# Patient Record
Sex: FEMALE | Race: BLACK OR AFRICAN AMERICAN | NOT HISPANIC OR LATINO | Employment: UNEMPLOYED | RURAL
[De-identification: names, ages, dates, MRNs, and addresses within clinical notes are randomized per-mention and may not be internally consistent; named-entity substitution may affect disease eponyms.]

---

## 2022-09-27 ENCOUNTER — TELEPHONE (OUTPATIENT)
Dept: FAMILY MEDICINE CLINIC | Facility: CLINIC | Age: 22
End: 2022-09-27

## 2022-09-27 NOTE — TELEPHONE ENCOUNTER
We have gotten a refill request for a 90 day supply of her depression medication. There is a note in our old system that states she is due for a follow up regarding her anxiety medication. I have tried to call her but there is no answer and no option to leave a voice mail. TF

## 2022-09-28 ENCOUNTER — TELEPHONE (OUTPATIENT)
Dept: FAMILY MEDICINE CLINIC | Facility: CLINIC | Age: 22
End: 2022-09-28

## 2022-09-28 RX ORDER — CHLORTHALIDONE 25 MG/1
25 TABLET ORAL DAILY
Qty: 30 TABLET | Refills: 0 | Status: SHIPPED | OUTPATIENT
Start: 2022-09-28

## 2022-09-28 RX ORDER — ESCITALOPRAM OXALATE 10 MG/1
TABLET ORAL
COMMUNITY
Start: 2022-08-23 | End: 2022-09-28 | Stop reason: SDUPTHER

## 2022-09-28 RX ORDER — ESCITALOPRAM OXALATE 10 MG/1
10 TABLET ORAL DAILY
Qty: 30 TABLET | Refills: 0 | Status: SHIPPED | OUTPATIENT
Start: 2022-09-28 | End: 2022-09-29 | Stop reason: SDUPTHER

## 2022-09-28 RX ORDER — AMLODIPINE BESYLATE 5 MG/1
5 TABLET ORAL DAILY
Qty: 30 TABLET | Refills: 0 | Status: SHIPPED | OUTPATIENT
Start: 2022-09-28 | End: 2023-01-10 | Stop reason: SDUPTHER

## 2022-09-28 NOTE — TELEPHONE ENCOUNTER
Caller: Vickie Harris    Relationship: Self    Best call back number: [phone]    Requested Prescriptions:   AMLODAPINE  CHLORTHAZINE  ESCITALOPRAN    Pharmacy where request should be sent:  [id number] - [place], [place] - [address] AT NEXT TO [facility] - [phone]  - [phone]     Additional details provided by patient: HAS LESS THAN 3 DAYS    Does the patient have less than a 3 day supply:  [x] Yes  [] No    Isaac Romano Rep   09/28/22 11:49 EDT

## 2022-09-29 RX ORDER — ESCITALOPRAM OXALATE 10 MG/1
10 TABLET ORAL DAILY
Qty: 30 TABLET | Refills: 0 | Status: SHIPPED | OUTPATIENT
Start: 2022-09-29 | End: 2022-11-09

## 2022-09-29 NOTE — TELEPHONE ENCOUNTER
Caller: KimberlyVickie    Relationship: Self    Best call back number: [phone]    Requested Prescriptions:   Requested Prescriptions     Pending Prescriptions Disp Refills   • escitalopram (LEXAPRO) 10 MG tablet 30 tablet 0     Sig: Take 1 tablet by mouth Daily.        Pharmacy where request should be sent: [id number] - [place], [place] - [facility] [address] AT NEXT TO [facility] - [phone]  - [phone] FX     Additional details provided by patient: WILL BE OUT TOMORROW AND SHE HAD SOMETHING COME UP WITH GRANDMOTHER SO SHE HAD TO CANCEL HER APPOINTMENT FOR TOMORROW BUT ONLY HAS ONE TABLET LEFT OF MEDICATION SHE WILL CALL AND RESCHEDULE WHEN SHE KNOWS HER WORK SCHEDULE    Does the patient have less than a 3 day supply:  [x] Yes  [] No    Isaac Hawkins Rep   09/29/22 15:06 EDT

## 2022-11-09 ENCOUNTER — OFFICE VISIT (OUTPATIENT)
Dept: FAMILY MEDICINE CLINIC | Facility: CLINIC | Age: 22
End: 2022-11-09

## 2022-11-09 VITALS
WEIGHT: 293 LBS | DIASTOLIC BLOOD PRESSURE: 88 MMHG | TEMPERATURE: 97.2 F | SYSTOLIC BLOOD PRESSURE: 152 MMHG | HEIGHT: 67 IN | BODY MASS INDEX: 45.99 KG/M2

## 2022-11-09 DIAGNOSIS — I10 ESSENTIAL HYPERTENSION: ICD-10-CM

## 2022-11-09 DIAGNOSIS — E66.01 MORBID (SEVERE) OBESITY DUE TO EXCESS CALORIES: ICD-10-CM

## 2022-11-09 DIAGNOSIS — F41.9 ANXIETY AND DEPRESSION: ICD-10-CM

## 2022-11-09 DIAGNOSIS — B34.9 VIRAL SYNDROME: Primary | ICD-10-CM

## 2022-11-09 DIAGNOSIS — F32.A ANXIETY AND DEPRESSION: ICD-10-CM

## 2022-11-09 LAB
EXPIRATION DATE: NORMAL
FLUAV AG UPPER RESP QL IA.RAPID: NOT DETECTED
FLUBV AG UPPER RESP QL IA.RAPID: NOT DETECTED
INTERNAL CONTROL: NORMAL
Lab: NORMAL
SARS-COV-2 RNA RESP QL NAA+PROBE: NOT DETECTED

## 2022-11-09 PROCEDURE — 99214 OFFICE O/P EST MOD 30 MIN: CPT | Performed by: INTERNAL MEDICINE

## 2022-11-09 PROCEDURE — 87428 SARSCOV & INF VIR A&B AG IA: CPT | Performed by: INTERNAL MEDICINE

## 2022-11-09 RX ORDER — ESCITALOPRAM OXALATE 20 MG/1
20 TABLET ORAL DAILY
Qty: 30 TABLET | Refills: 1 | Status: SHIPPED | OUTPATIENT
Start: 2022-11-09 | End: 2022-12-02 | Stop reason: SDUPTHER

## 2022-11-09 RX ORDER — GUAIFENESIN/DEXTROMETHORPHAN 100-10MG/5
5 SYRUP ORAL 3 TIMES DAILY PRN
Qty: 120 ML | Refills: 0 | Status: SHIPPED | OUTPATIENT
Start: 2022-11-09 | End: 2022-11-29 | Stop reason: SDUPTHER

## 2022-11-09 RX ORDER — LOSARTAN POTASSIUM 50 MG/1
50 TABLET ORAL DAILY
COMMUNITY

## 2022-11-09 NOTE — ASSESSMENT & PLAN NOTE
Longstanding pattern of more anxiety and depressive symptoms for which she has been taking Lexapro 10 mg daily with benefit.  Nonetheless she feels she is had some modest breakthrough pattern for many months, with no SI/HI.  We will increase Lexapro from 10 mg up to 20 mg daily.  We could consider adding buspirone in the future.  I will let her reassess how she is doing with her regular provider, Mckenna Rea when she follows up.  Continue lifestyle modifications to benefit mood.

## 2022-11-09 NOTE — ASSESSMENT & PLAN NOTE
She has what sounds like historically good control on amlodipine 5 mg daily, chlorthalidone 25 mg daily and losartan 50 mg daily but has increased blood pressure today related to decongestant use.  Recommend avoidance.  Continue medicine and she needs to start monitoring more regularly in anticipation of her follow-up visit in 2 months for physical.  Advise continued elevations.

## 2022-11-09 NOTE — PROGRESS NOTES
Office Note     Name: Vickie Harris    : 2000     MRN: [id number]     Chief Complaint  Sore Throat (Cough, fever throat congestion diarrhea fatigue)    Subjective     History of Present Illness:  Vickie Harris is a 22 y.o. female who presents today for acute visit and dressing a couple of her medical problems.  She has had 3 to 4 days of congestion drainage and cough, some headache and achiness, mild decreased energy and appetite.  Some intermittent looser bowel movements with no vomiting or nausea.  Low-grade fever 2 nights ago, then again yesterday but starting to feel better today.  Good hydration, good urine output.  No shortness of breath.  No rash.    Unrelated to her illness, her blood pressure is higher and it sounds as though she has been taking a decongestant containing cough medicine for the last few days, I discussed recommends avoidance.  Her blood pressure otherwise is checked infrequently but when she does she believes it is in the 120s over upper 70s low 80s.  No lightheadedness, dizziness, chest pain or palpitations.  Regarding anxiety and depressive symptoms which are longstanding, she continues on Lexapro 10 mg tablet daily.  She does feel for many months has had some modest decreased energy, irritability and modest increase in her anxiety symptoms.  She would be interested in titrating up her dose of medicine.    Review of Systems    Objective     Past Medical History:   Diagnosis Date   • Acanthosis nigricans    • COVID-19    • Gastritis     secondary to NSAIDs   • Influenza due to unidentified influenza virus with other respiratory manifestations    • Major depressive disorder, single episode, unspecified    • Morbid obesity (HCC)    • Obstructive sleep apnea on CPAP    • Other conjunctivitis    • Prediabetes    • Viral infection, unspecified      Past Surgical History:   Procedure Laterality Date   • TONSILLECTOMY AND ADENOIDECTOMY       Family History   Problem Relation Age  "of Onset   • Heart failure Father    • Obesity Father    • Diabetes Other    • Hypertension Other        Vital Signs  /88   Temp 97.2 °F (36.2 °C) (Temporal)   Ht 170.2 cm (67\")   Wt (!) 200 kg (441 lb)   BMI 69.07 kg/m²   Estimated body mass index is 69.07 kg/m² as calculated from the following:    Height as of this encounter: 170.2 cm (67\").    Weight as of this encounter: 200 kg (441 lb).    Physical Exam  Constitutional:       General: She is not in acute distress.     Appearance: Normal appearance. She is obese. She is not ill-appearing, toxic-appearing or diaphoretic.   HENT:      Head: Normocephalic and atraumatic.      Right Ear: Ear canal and external ear normal.      Left Ear: Ear canal and external ear normal.      Ears:      Comments: Mild to moderate fluid behind the TMs bilaterally, otherwise clear     Nose: Rhinorrhea present.      Comments: Moderate clear rhinorrhea     Mouth/Throat:      Mouth: Mucous membranes are moist.      Pharynx: Oropharynx is clear. No oropharyngeal exudate or posterior oropharyngeal erythema.   Eyes:      Extraocular Movements: Extraocular movements intact.      Conjunctiva/sclera: Conjunctivae normal.      Pupils: Pupils are equal, round, and reactive to light.   Cardiovascular:      Rate and Rhythm: Normal rate and regular rhythm.      Pulses: Normal pulses.      Heart sounds: Normal heart sounds. No murmur heard.    No friction rub. No gallop.   Pulmonary:      Effort: Pulmonary effort is normal. No respiratory distress.      Breath sounds: Normal breath sounds. No stridor. No wheezing.   Abdominal:      General: Abdomen is flat. Bowel sounds are normal. There is no distension.      Palpations: Abdomen is soft. There is no mass.      Tenderness: There is no abdominal tenderness. There is no guarding or rebound.      Hernia: No hernia is present.   Musculoskeletal:      Right lower leg: No edema.      Left lower leg: No edema.   Skin:     General: Skin is warm " and dry.      Capillary Refill: Capillary refill takes less than 2 seconds.   Neurological:      General: No focal deficit present.      Mental Status: She is alert and oriented to person, place, and time. Mental status is at baseline.   Psychiatric:         Mood and Affect: Mood normal.         Behavior: Behavior normal.         Thought Content: Thought content normal.         Judgment: Judgment normal.                   POCT Results (if applicable):  Results for orders placed or performed in visit on 11/09/22   Covid-19 + Flu A&B AG, Veritor    Specimen: Swab   Result Value Ref Range    COVID19 Not Detected Not Detected - Ref. Range    Influenza A Antigen JUSTIN Not Detected Not Detected    Influenza B Antigen JUSTIN Not Detected Not Detected    Internal Control Passed Passed    Lot Number [id number]     Expiration Date 03/09/2023             Assessment and Plan     Diagnoses and all orders for this visit:    1. Viral syndrome (Primary)  Assessment & Plan:  Flu screen negative, COVID-19 testing negative.  Consistent with another viral illness which is common in the community right now.  Recommend symptomatic treatment saline spray, cool-mist humidifier, Tylenol/Advil as needed.  With increased blood pressure on decongestant containing cough medicine I prescribed Robitussin-DM to use as needed.  Avoid decongestants.  Advise if not improving.    Orders:  -     guaiFENesin-dextromethorphan (ROBITUSSIN DM) 100-10 MG/5ML syrup; Take 5 mL by mouth 3 (Three) Times a Day As Needed for Cough.  Dispense: 120 mL; Refill: 0  -     Covid-19 + Flu A&B AG, Veritor    2. Anxiety and depression  Assessment & Plan:  Longstanding pattern of more anxiety and depressive symptoms for which she has been taking Lexapro 10 mg daily with benefit.  Nonetheless she feels she is had some modest breakthrough pattern for many months, with no SI/HI.  We will increase Lexapro from 10 mg up to 20 mg daily.  We could consider adding buspirone in the  future.  I will let her reassess how she is doing with her regular provider, Mckenna Rea when she follows up.  Continue lifestyle modifications to benefit mood.    Orders:  -     escitalopram (Lexapro) 20 MG tablet; Take 1 tablet by mouth Daily.  Dispense: 30 tablet; Refill: 1    3. Essential hypertension  Assessment & Plan:  She has what sounds like historically good control on amlodipine 5 mg daily, chlorthalidone 25 mg daily and losartan 50 mg daily but has increased blood pressure today related to decongestant use.  Recommend avoidance.  Continue medicine and she needs to start monitoring more regularly in anticipation of her follow-up visit in 2 months for physical.  Advise continued elevations.      4. Morbid (severe) obesity due to excess calories (HCC)  Assessment & Plan:  Longstanding problem for which reinforced importance of healthy diet, exercise and benefits of weight loss.      Class 3 Severe Obesity (BMI >=40). Obesity-related health conditions include the following: hypertension. Obesity is unchanged. BMI is is above average; BMI management plan is completed. We discussed low calorie, low carb based diet program, portion control and increasing exercise.      Follow Up  Return in about 2 months (around 1/9/2023) for Annual physical.    Osbaldo Valdez MD

## 2022-11-09 NOTE — ASSESSMENT & PLAN NOTE
Longstanding problem for which reinforced importance of healthy diet, exercise and benefits of weight loss.

## 2022-11-09 NOTE — ASSESSMENT & PLAN NOTE
Flu screen negative, COVID-19 testing negative.  Consistent with another viral illness which is common in the community right now.  Recommend symptomatic treatment saline spray, cool-mist humidifier, Tylenol/Advil as needed.  With increased blood pressure on decongestant containing cough medicine I prescribed Robitussin-DM to use as needed.  Avoid decongestants.  Advise if not improving.

## 2022-11-29 ENCOUNTER — OFFICE VISIT (OUTPATIENT)
Dept: FAMILY MEDICINE CLINIC | Facility: CLINIC | Age: 22
End: 2022-11-29

## 2022-11-29 VITALS
HEART RATE: 126 BPM | TEMPERATURE: 97.1 F | SYSTOLIC BLOOD PRESSURE: 128 MMHG | RESPIRATION RATE: 18 BRPM | BODY MASS INDEX: 45.99 KG/M2 | HEIGHT: 67 IN | WEIGHT: 293 LBS | OXYGEN SATURATION: 97 % | DIASTOLIC BLOOD PRESSURE: 76 MMHG

## 2022-11-29 DIAGNOSIS — R05.9 COUGH, UNSPECIFIED TYPE: Primary | ICD-10-CM

## 2022-11-29 DIAGNOSIS — J40 BRONCHITIS: ICD-10-CM

## 2022-11-29 DIAGNOSIS — B34.9 VIRAL SYNDROME: ICD-10-CM

## 2022-11-29 PROCEDURE — 87428 SARSCOV & INF VIR A&B AG IA: CPT | Performed by: NURSE PRACTITIONER

## 2022-11-29 PROCEDURE — 99213 OFFICE O/P EST LOW 20 MIN: CPT | Performed by: NURSE PRACTITIONER

## 2022-11-29 RX ORDER — GUAIFENESIN/DEXTROMETHORPHAN 100-10MG/5
5 SYRUP ORAL 3 TIMES DAILY PRN
Qty: 120 ML | Refills: 0 | Status: SHIPPED | OUTPATIENT
Start: 2022-11-29 | End: 2023-03-29

## 2022-11-29 RX ORDER — AZITHROMYCIN 250 MG/1
TABLET, FILM COATED ORAL
Qty: 6 TABLET | Refills: 0 | Status: SHIPPED | OUTPATIENT
Start: 2022-11-29 | End: 2023-03-29

## 2022-11-29 NOTE — PROGRESS NOTES
Office Note     Name: Vickie Harris    : 2000     MRN: [id number]     Chief Complaint  Cough, mucus, Fatigue, and Generalized Body Aches    Subjective     History of Present Illness:  Vickie Harris is a 22 y.o. female who presents today for acute complaints of worsening cough, non-productive in nature. She was initially seen by Dr. Osbaldo Valdez  with complaints of congestion, drainage and cough  X 3-4 days.   Flu screen and Flu negative. She was given Robitussin DM for viral syndrome at that time. She reports she initially showed improvement in symptoms, however about three days ago her cough worsened and became productive of yellow to green sputum. She also notes some accompanying feelings of chest tightness since her cough worsened. No fever, sore throat, ear pain, rash or GI symptoms. No further complaints or concerns today.   Review of Systems   Constitutional: Positive for fatigue. Negative for chills and fever.   HENT: Positive for congestion. Negative for ear pain, rhinorrhea and sore throat.    Eyes: Negative for discharge and redness.   Respiratory: Positive for cough and chest tightness. Negative for shortness of breath and wheezing.    Cardiovascular: Negative for chest pain, palpitations and leg swelling.   Gastrointestinal: Negative for abdominal pain, constipation, diarrhea, nausea and vomiting.   Endocrine: Negative for polydipsia and polyuria.   Musculoskeletal: Negative for arthralgias, back pain and myalgias.   Skin: Negative for rash.   Neurological: Negative for dizziness, weakness and headache.   Psychiatric/Behavioral: Negative for sleep disturbance and depressed mood. The patient is not nervous/anxious.        Objective     Past Medical History:   Diagnosis Date   • Acanthosis nigricans    • COVID-19    • Gastritis     secondary to NSAIDs   • Influenza due to unidentified influenza virus with other respiratory manifestations    • Major depressive disorder, single episode,  "unspecified    • Morbid obesity (HCC)    • Obstructive sleep apnea on CPAP    • Other conjunctivitis    • Prediabetes    • Viral infection, unspecified      Past Surgical History:   Procedure Laterality Date   • TONSILLECTOMY AND ADENOIDECTOMY       Family History   Problem Relation Age of Onset   • Heart failure Father    • Obesity Father    • Diabetes Other    • Hypertension Other        Vital Signs  /76 (BP Location: Left arm, Patient Position: Sitting, Cuff Size: Adult)   Pulse (!) 126   Temp 97.1 °F (36.2 °C) (Temporal)   Resp 18   Ht 170.2 cm (67\")   Wt (!) 207 kg (456 lb)   SpO2 97%   BMI 71.42 kg/m²   Estimated body mass index is 71.42 kg/m² as calculated from the following:    Height as of this encounter: 170.2 cm (67\").    Weight as of this encounter: 207 kg (456 lb).    Physical Exam         POCT Results (if applicable):  Results for orders placed or performed in visit on 11/29/22   Covid-19 + Flu A&B AG, Veritor    Specimen: Swab   Result Value Ref Range    COVID19 Not Detected Not Detected - Ref. Range    Influenza A Antigen JUSTIN Not Detected Not Detected    Influenza B Antigen JUSTIN Not Detected Not Detected    Internal Control Passed Passed    Lot Number [id number]     Expiration Date 3,092,023             Assessment and Plan     Diagnoses and all orders for this visit:    1. Cough, unspecified type (Primary)  -     Covid-19 + Flu A&B AG, Veritor    2. Viral syndrome    3. Bronchitis  Assessment & Plan:  Initially evaluated earlier this month on 11/09 with complaints of cough, congestion and low grade fever. Flu and COVID negative. She was treated for other viral syndrome with robitussin DM. She reports her symptoms initially improved, however 3 or 4 days ago her cough worsened, became productive of yellow to green sputum and she began to feel a sense of tightness in her chest. Cough noticeably worse at night.     -Azithromycin zpack as written for bronchitis secondary to viral syndrome not " COVID or Flu  -Robitussin DM as written for cough  -cool mist humidifier    Orders:  -     azithromycin (Zithromax Z-Jeffery) 250 MG tablet; Take 2 tablets by mouth on day 1, then 1 tablet daily on days 2-5  Dispense: 6 tablet; Refill: 0  -     guaiFENesin-dextromethorphan (ROBITUSSIN DM) 100-10 MG/5ML syrup; Take 5 mL by mouth 3 (Three) Times a Day As Needed for Cough.  Dispense: 120 mL; Refill: 0      Follow Up  No follow-ups on file.    Mckenna Rea, APRN

## 2022-11-29 NOTE — ASSESSMENT & PLAN NOTE
Initially evaluated earlier this month on 11/09 with complaints of cough, congestion and low grade fever. Flu and COVID negative. She was treated for other viral syndrome with robitussin DM. She reports her symptoms initially improved, however 3 or 4 days ago her cough worsened, became productive of yellow to green sputum and she began to feel a sense of tightness in her chest. Cough noticeably worse at night.     -Azithromycin zpack as written for bronchitis secondary to viral syndrome not COVID or Flu  -Robitussin DM as written for cough  -cool mist humidifier

## 2022-12-02 DIAGNOSIS — F32.A ANXIETY AND DEPRESSION: ICD-10-CM

## 2022-12-02 DIAGNOSIS — F41.9 ANXIETY AND DEPRESSION: ICD-10-CM

## 2022-12-02 RX ORDER — ESCITALOPRAM OXALATE 20 MG/1
20 TABLET ORAL DAILY
Qty: 30 TABLET | Refills: 1 | Status: SHIPPED | OUTPATIENT
Start: 2022-12-02 | End: 2023-03-08

## 2023-01-10 RX ORDER — AMLODIPINE BESYLATE 5 MG/1
5 TABLET ORAL DAILY
Qty: 30 TABLET | Refills: 0 | Status: SHIPPED | OUTPATIENT
Start: 2023-01-10

## 2023-01-10 NOTE — TELEPHONE ENCOUNTER
Caller: Vickie Harris    Relationship: Self    Best call back number: [phone]    Requested Prescriptions:   Requested Prescriptions     Pending Prescriptions Disp Refills   • amLODIPine (NORVASC) 5 MG tablet 30 tablet 0     Sig: Take 1 tablet by mouth Daily.      Pharmacy where request should be sent: [id number] - [place], [place] - [facility] [address]EXT TO [facility] - [phone]  - [phone] FX     Additional details provided by patient:     PATIENT ADVISED SHE IS COMPLETELY OUT OF THE MEDICATION.    PATIENT ADVISED SHE RECENTLY MOVED AND HAS MISPLACED HER MEDICATION ACCIDENTALLY.     Does the patient have less than a 3 day supply:  [x] Yes  [] No    Would you like a call back once the refill request has been completed: [x] Yes [] No    If the office needs to give you a call back, can they leave a voicemail: [x] Yes [] No    Isaac Crocker Rep   01/10/23 11:57 EST         DELETE AFTER READING TO PATIENT: “Thank you for sharing this information with me. I will send a message to the clinical team. Please allow 48 hours for the clinical staff to follow up on this request.”

## 2023-03-08 DIAGNOSIS — F32.A ANXIETY AND DEPRESSION: ICD-10-CM

## 2023-03-08 DIAGNOSIS — F41.9 ANXIETY AND DEPRESSION: ICD-10-CM

## 2023-03-08 RX ORDER — ESCITALOPRAM OXALATE 20 MG/1
TABLET ORAL
Qty: 30 TABLET | Refills: 1 | Status: SHIPPED | OUTPATIENT
Start: 2023-03-08

## 2023-03-29 ENCOUNTER — OFFICE VISIT (OUTPATIENT)
Dept: FAMILY MEDICINE CLINIC | Facility: CLINIC | Age: 23
End: 2023-03-29
Payer: COMMERCIAL

## 2023-03-29 VITALS — HEIGHT: 67 IN | BODY MASS INDEX: 45.99 KG/M2 | WEIGHT: 293 LBS | TEMPERATURE: 98.1 F

## 2023-03-29 DIAGNOSIS — B34.9 VIRAL SYNDROME: Primary | ICD-10-CM

## 2023-03-29 PROCEDURE — 99213 OFFICE O/P EST LOW 20 MIN: CPT | Performed by: NURSE PRACTITIONER

## 2023-03-29 RX ORDER — BROMPHENIRAMINE MALEATE, PSEUDOEPHEDRINE HYDROCHLORIDE, AND DEXTROMETHORPHAN HYDROBROMIDE 2; 30; 10 MG/5ML; MG/5ML; MG/5ML
10 SYRUP ORAL 4 TIMES DAILY PRN
Qty: 200 ML | Refills: 0 | Status: SHIPPED | OUTPATIENT
Start: 2023-03-29

## 2023-03-29 NOTE — PROGRESS NOTES
Office Note     Name: Vickie Harris    : 2000     MRN: [id number]     Chief Complaint  Nasal Congestion, Shortness of Breath, Cough (Started monday), and Fever (Highest 102.3)    Subjective     History of Present Illness:  Vickie Harris is a 22 y.o. female who presents today for complaints of nasal congestion, shortness of breath, cough and fever.  She reports 2 days ago awakening with a scratchy throat and some chest heaviness.  Over the course of that day she developed harsh, nonproductive cough, nasal congestion and spiked fever of 102.3.  She denies any sore throat, ear pain or GI issues.  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  She is taking home COVID test which was negative.  She has utilized over-the-counter cough syrup with no benefit. She has also utilized Vicks VapoRub on her chest with some benefit.  She notes decreased appetite but has continued to hydrate adequately. she makes note of significant headache with coughing.  No further complaints or concerns  Review of Systems   Constitutional: Positive for appetite change, fatigue and fever.   HENT: Positive for congestion and sinus pressure. Negative for ear pain and sore throat.    Respiratory: Positive for cough and chest tightness. Negative for choking, shortness of breath and wheezing.    Cardiovascular: Negative for chest pain, palpitations and leg swelling.   Gastrointestinal: Negative for abdominal pain, constipation, diarrhea, nausea and vomiting.   Endocrine: Negative for polydipsia and polyuria.   Neurological: Positive for headache. Negative for dizziness, weakness, light-headedness and numbness.       Objective     Past Medical History:   Diagnosis Date   • Acanthosis nigricans    • COVID-19    • Gastritis     secondary to NSAIDs   • Influenza due to unidentified influenza virus with other respiratory manifestations    • Major depressive disorder, single episode,  "unspecified    • Morbid obesity (HCC)    • Obstructive sleep apnea on CPAP    • Other conjunctivitis    • Prediabetes    • Viral infection, unspecified      Past Surgical History:   Procedure Laterality Date   • TONSILLECTOMY AND ADENOIDECTOMY       Family History   Problem Relation Age of Onset   • Heart failure Father    • Obesity Father    • Diabetes Other    • Hypertension Other        Vital Signs  Temp 98.1 °F (36.7 °C) (Temporal)   Ht 170.2 cm (67\")   Wt (!) 204 kg (450 lb)   BMI 70.48 kg/m²   Estimated body mass index is 70.48 kg/m² as calculated from the following:    Height as of this encounter: 170.2 cm (67\").    Weight as of this encounter: 204 kg (450 lb).    Physical Exam  Vitals reviewed.   Constitutional:       Appearance: Normal appearance.   HENT:      Head: Normocephalic and atraumatic.      Right Ear: Tympanic membrane, ear canal and external ear normal.      Left Ear: Tympanic membrane, ear canal and external ear normal.      Nose: Nose normal.      Mouth/Throat:      Pharynx: Oropharynx is clear. Posterior oropharyngeal erythema present.   Eyes:      Conjunctiva/sclera: Conjunctivae normal.   Cardiovascular:      Rate and Rhythm: Normal rate and regular rhythm.      Pulses: Normal pulses.      Heart sounds: Normal heart sounds.   Pulmonary:      Effort: Pulmonary effort is normal. No respiratory distress.      Breath sounds: Normal breath sounds. No wheezing or rhonchi.   Musculoskeletal:      Cervical back: Normal range of motion and neck supple.   Skin:     General: Skin is warm and dry.   Neurological:      Mental Status: She is alert and oriented to person, place, and time.            POCT Results (if applicable):  Results for orders placed or performed in visit on 11/29/22   Covid-19 + Flu A&B AG, Veritor    Specimen: Swab   Result Value Ref Range    COVID19 Not Detected Not Detected - Ref. Range    Influenza A Antigen JUSTIN Not Detected Not Detected    Influenza B Antigen JUSTIN Not Detected " Not Detected    Internal Control Passed Passed    Lot Number [id number]     Expiration Date 3,092,023             Assessment and Plan     Diagnoses and all orders for this visit:    1. Viral syndrome (Primary)  Assessment & Plan:  Flu and COVID screen negative Sister with another viral illness which is common in the right now.  May continue symptomatic treatment.  Advised use of cool-mist humidifier, Tylenol/Advil as needed.  We will send a prescription for Bromfed cough syrup as her current Robitussin has been ineffective thus far.  Discussed signs of secondary bacterial infection including improvement followed by significant worsening of symptoms.  Discussed if she develops difficulty breathing, productive cough or notable wheezing to call office for further instruction.      Other orders  -     brompheniramine-pseudoephedrine-DM 30-2-10 MG/5ML syrup; Take 10 mL by mouth 4 (Four) Times a Day As Needed for Allergies.  Dispense: 200 mL; Refill: 0        Follow Up  No follow-ups on file.    Mckenna Rea, APRN

## 2023-03-29 NOTE — ASSESSMENT & PLAN NOTE
Flu and COVID screen negative Sister with another viral illness which is common in the right now.  May continue symptomatic treatment.  Advised use of cool-mist humidifier, Tylenol/Advil as needed.  We will send a prescription for Bromfed cough syrup as her current Robitussin has been ineffective thus far.  Discussed signs of secondary bacterial infection including improvement followed by significant worsening of symptoms.  Discussed if she develops difficulty breathing, productive cough or notable wheezing to call office for further instruction.

## 2023-04-07 ENCOUNTER — TELEPHONE (OUTPATIENT)
Dept: FAMILY MEDICINE CLINIC | Facility: CLINIC | Age: 23
End: 2023-04-07
Payer: COMMERCIAL

## 2023-04-07 RX ORDER — AZITHROMYCIN 250 MG/1
TABLET, FILM COATED ORAL
Qty: 6 TABLET | Refills: 0 | Status: SHIPPED | OUTPATIENT
Start: 2023-04-07

## 2023-04-07 NOTE — TELEPHONE ENCOUNTER
PHONE CALL FROM PATIENT.  SYMPTOMS ARE WORSE.  HEAVY COUGHING, CONGESTED CHEST, MUCUS, SORE THROAT, EAR PAIN.  WHAT TO DO?    PLEASE CALL [phone]

## 2023-05-26 RX ORDER — AMLODIPINE BESYLATE 5 MG/1
TABLET ORAL
Qty: 30 TABLET | Refills: 0 | Status: SHIPPED | OUTPATIENT
Start: 2023-05-26

## 2023-06-15 RX ORDER — CHLORTHALIDONE 25 MG/1
TABLET ORAL
Qty: 30 TABLET | Refills: 0 | Status: SHIPPED | OUTPATIENT
Start: 2023-06-15

## 2023-06-15 RX ORDER — LOSARTAN POTASSIUM 50 MG/1
50 TABLET ORAL DAILY
Qty: 90 TABLET | Refills: 0 | Status: SHIPPED | OUTPATIENT
Start: 2023-06-15 | End: 2023-06-19

## 2023-06-15 NOTE — TELEPHONE ENCOUNTER
Caller: KimberlyVickie thomas    Relationship: Self    Best call back number: [phone]     Requested Prescriptions:   Requested Prescriptions     Pending Prescriptions Disp Refills    losartan (COZAAR) 50 MG tablet       Sig: Take 1 tablet by mouth Daily.        Pharmacy where request should be sent: [id number] - [place], [place] - [address]EXT TO [facility] - [phone]  - [phone]      Last office visit with prescribing clinician: 3/29/2023   Last telemedicine visit with prescribing clinician: Visit date not found   Next office visit with prescribing clinician: Visit date not found     Additional details provided by patient: PATIENT IS OUT OF REFILLS AND MEDICATION    Does the patient have less than a 3 day supply:  [x] Yes  [] No    Would you like a call back once the refill request has been completed: [x] Yes [] No    If the office needs to give you a call back, can they leave a voicemail: [x] Yes [] No    Isaac Ospina Rep   06/15/23 10:22 EDT

## 2023-06-19 ENCOUNTER — OFFICE VISIT (OUTPATIENT)
Dept: FAMILY MEDICINE CLINIC | Facility: CLINIC | Age: 23
End: 2023-06-19
Payer: COMMERCIAL

## 2023-06-19 VITALS
TEMPERATURE: 98 F | WEIGHT: 293 LBS | OXYGEN SATURATION: 99 % | SYSTOLIC BLOOD PRESSURE: 146 MMHG | HEART RATE: 98 BPM | HEIGHT: 67 IN | BODY MASS INDEX: 45.99 KG/M2 | DIASTOLIC BLOOD PRESSURE: 82 MMHG

## 2023-06-19 DIAGNOSIS — J30.1 SEASONAL ALLERGIC RHINITIS DUE TO POLLEN: ICD-10-CM

## 2023-06-19 DIAGNOSIS — I10 ESSENTIAL HYPERTENSION: ICD-10-CM

## 2023-06-19 DIAGNOSIS — N30.01 ACUTE CYSTITIS WITH HEMATURIA: Primary | ICD-10-CM

## 2023-06-19 PROBLEM — R30.0 DYSURIA: Status: ACTIVE | Noted: 2023-06-19

## 2023-06-19 LAB
BILIRUB BLD-MCNC: NEGATIVE MG/DL
CLARITY, POC: ABNORMAL
COLOR UR: YELLOW
GLUCOSE UR STRIP-MCNC: NEGATIVE MG/DL
KETONES UR QL: NEGATIVE
LEUKOCYTE EST, POC: ABNORMAL
NITRITE UR-MCNC: NEGATIVE MG/ML
PH UR: 6.5 [PH] (ref 5–8)
PROT UR STRIP-MCNC: ABNORMAL MG/DL
RBC # UR STRIP: ABNORMAL /UL
SP GR UR: 1.01 (ref 1–1.03)
UROBILINOGEN UR QL: ABNORMAL

## 2023-06-19 PROCEDURE — 99214 OFFICE O/P EST MOD 30 MIN: CPT | Performed by: INTERNAL MEDICINE

## 2023-06-19 PROCEDURE — 3077F SYST BP >= 140 MM HG: CPT | Performed by: INTERNAL MEDICINE

## 2023-06-19 PROCEDURE — 1159F MED LIST DOCD IN RCRD: CPT | Performed by: INTERNAL MEDICINE

## 2023-06-19 PROCEDURE — 3079F DIAST BP 80-89 MM HG: CPT | Performed by: INTERNAL MEDICINE

## 2023-06-19 PROCEDURE — 1160F RVW MEDS BY RX/DR IN RCRD: CPT | Performed by: INTERNAL MEDICINE

## 2023-06-19 RX ORDER — CETIRIZINE HYDROCHLORIDE 10 MG/1
10 TABLET ORAL DAILY
Qty: 30 TABLET | Refills: 3 | Status: SHIPPED | OUTPATIENT
Start: 2023-06-19

## 2023-06-19 RX ORDER — LOSARTAN POTASSIUM 100 MG/1
100 TABLET ORAL DAILY
Qty: 30 TABLET | Refills: 1 | Status: SHIPPED | OUTPATIENT
Start: 2023-06-19

## 2023-06-19 RX ORDER — FLUTICASONE PROPIONATE 50 MCG
2 SPRAY, SUSPENSION (ML) NASAL DAILY
Qty: 15.8 ML | Refills: 3 | Status: SHIPPED | OUTPATIENT
Start: 2023-06-19

## 2023-06-19 RX ORDER — NITROFURANTOIN 25; 75 MG/1; MG/1
100 CAPSULE ORAL 2 TIMES DAILY
Qty: 14 CAPSULE | Refills: 0 | Status: SHIPPED | OUTPATIENT
Start: 2023-06-19

## 2023-06-19 NOTE — ASSESSMENT & PLAN NOTE
Ongoing pattern congestion and drainage, breakthrough of the last month or so.  Prescription provided for Zyrtec and Flonase to use together for the next couple weeks, then as needed seasonally.  Singulair could be added in future with breakthrough symptoms.  Additional benefit of saline spray, nasal flushing.  Advise concerns.

## 2023-06-19 NOTE — ASSESSMENT & PLAN NOTE
Blood pressure elevated in clinic, and with patient report running typically in the 130s to 140s systolic over mid upper 80s diastolic.  Increase losartan 50 mg up to 100 mg daily, continue Norvasc 5 mg daily and chlorthalidone 25 mg daily.  Continue benefits of healthy diet, exercise, weight loss, salt decrease.  On her blood pressure regularly.  Reassess at follow-up visit with Mckenna Rea in 1 month for physical.

## 2023-06-19 NOTE — ASSESSMENT & PLAN NOTE
Typical UTI type symptoms, with diagnosis consistent with acute cystitis with modest hematuria.  Onset over the last couple days with no constitutional Davis.  Initiate nitrofurantoin 100 mg twice daily x7 days.  Push fluids, she can continue using the Azo.  Tylenol/Advil as needed.  Advised if not improving.

## 2023-06-19 NOTE — PROGRESS NOTES
"    Office Note     Name: Vickie Harris    : 2000     MRN: [id number]     Chief Complaint  Urinary Tract Infection (Patient states she is having a burning sensation along with pain, smell to urine, been going on for 3 days. )    Subjective     History of Present Illness:  Vickie Harris is a 22 y.o. female who presents today for acute visit.  Regular patient of Mckenna Rea.  Main concern of urinary symptoms with onset the last few days with urinary frequency, urgency, sense of incomplete voiding.  Also strong smell of the urine.  No flank pain.  Mild suprapubic discomfort which waxes and wanes related urination.  No fevers or chills.  Otherwise she feels at baseline.  Regarding blood pressure, modestly elevated in clinic today and she does not check it very often but when she does she believes it is in the 130s to 140s systolic, unsure exact diastolic measurement.  She also keeps some component of congestion drainage worse the last few weeks, with sneezing, but no shortness of breath or chest tightness.  Sometimes a popping sensation in the ears.    Review of Systems    Objective     Past Medical History:   Diagnosis Date    Acanthosis nigricans     COVID-19     Gastritis     secondary to NSAIDs    Influenza due to unidentified influenza virus with other respiratory manifestations     Major depressive disorder, single episode, unspecified     Morbid obesity     Obstructive sleep apnea on CPAP     Other conjunctivitis     Prediabetes     Viral infection, unspecified      Past Surgical History:   Procedure Laterality Date    TONSILLECTOMY AND ADENOIDECTOMY       Family History   Problem Relation Age of Onset    Heart failure Father     Obesity Father     Diabetes Other     Hypertension Other        Vital Signs  /82 (BP Location: Left arm, Patient Position: Sitting, Cuff Size: Large Adult)   Pulse 98   Temp 98 °F (36.7 °C) (Temporal)   Ht 170.2 cm (67\")   Wt (!) 254 kg (560 lb)   SpO2 99%   " "BMI 87.71 kg/m²   Estimated body mass index is 87.71 kg/m² as calculated from the following:    Height as of this encounter: 170.2 cm (67\").    Weight as of this encounter: 254 kg (560 lb).    Physical Exam  Constitutional:       General: She is not in acute distress.     Appearance: Normal appearance. She is not ill-appearing, toxic-appearing or diaphoretic.   HENT:      Head: Normocephalic and atraumatic.      Right Ear: Ear canal and external ear normal.      Left Ear: Ear canal and external ear normal.      Ears:      Comments: Mild to moderate fluid behind the TMs bilaterally, otherwise clear     Nose: Nose normal. Rhinorrhea present.      Comments: Moderate clear rhinorrhea, pale mucosa     Mouth/Throat:      Mouth: Mucous membranes are moist.      Pharynx: Oropharynx is clear. No oropharyngeal exudate or posterior oropharyngeal erythema.   Neck:      Vascular: No carotid bruit.      Comments: No thyroid enlargement  Cardiovascular:      Rate and Rhythm: Normal rate and regular rhythm.      Pulses: Normal pulses.      Heart sounds: Normal heart sounds. No murmur heard.    No friction rub. No gallop.   Pulmonary:      Effort: Pulmonary effort is normal. No respiratory distress.      Breath sounds: Normal breath sounds. No stridor. No wheezing.   Abdominal:      General: Abdomen is flat. Bowel sounds are normal. There is no distension.      Palpations: Abdomen is soft.      Tenderness: There is abdominal tenderness. There is no guarding or rebound.      Comments: Very mild tenderness in suprapubic region, negative rebound and guarding.  Negative CVA tenderness bilaterally.  Abdominal exam limited by body habitus.   Musculoskeletal:      Cervical back: Neck supple. No tenderness.      Right lower leg: No edema.      Left lower leg: No edema.   Lymphadenopathy:      Cervical: No cervical adenopathy.   Skin:     General: Skin is warm and dry.      Capillary Refill: Capillary refill takes less than 2 seconds. "   Neurological:      General: No focal deficit present.      Mental Status: She is alert and oriented to person, place, and time. Mental status is at baseline.   Psychiatric:         Mood and Affect: Mood normal.         Behavior: Behavior normal.         Thought Content: Thought content normal.                 POCT Results (if applicable):  Results for orders placed or performed in visit on 06/19/23   POC Urinalysis Dipstick    Specimen: Urine   Result Value Ref Range    Color Yellow Yellow, Straw, Dark Yellow, Kerry    Clarity, UA Cloudy (A) Clear    Glucose, UA Negative (A) Negative mg/dL    Bilirubin Negative Negative    Ketones, UA Negative Negative    Specific Gravity  1.015 1.005 - 1.030    Blood, UA 1+ (A) Negative    pH, Urine 6.5 5.0 - 8.0    Protein, POC Trace (A) Negative mg/dL    Urobilinogen, UA 0.2 E.U./dL Normal, 0.2 E.U./dL    Leukocytes Trace (A) Negative    Nitrite, UA Negative Negative            Assessment and Plan     Diagnoses and all orders for this visit:    1. Acute cystitis with hematuria (Primary)  Assessment & Plan:  Typical UTI type symptoms, with diagnosis consistent with acute cystitis with modest hematuria.  Onset over the last couple days with no constitutional Davis.  Initiate nitrofurantoin 100 mg twice daily x7 days.  Push fluids, she can continue using the Azo.  Tylenol/Advil as needed.  Advised if not improving.    Orders:  -     POC Urinalysis Dipstick  -     nitrofurantoin, macrocrystal-monohydrate, (Macrobid) 100 MG capsule; Take 1 capsule by mouth 2 (Two) Times a Day.  Dispense: 14 capsule; Refill: 0    2. Essential hypertension  Assessment & Plan:  Blood pressure elevated in clinic, and with patient report running typically in the 130s to 140s systolic over mid upper 80s diastolic.  Increase losartan 50 mg up to 100 mg daily, continue Norvasc 5 mg daily and chlorthalidone 25 mg daily.  Continue benefits of healthy diet, exercise, weight loss, salt decrease.  On her blood  pressure regularly.  Reassess at follow-up visit with Mckenna Rea in 1 month for physical.      3. Seasonal allergic rhinitis due to pollen  Assessment & Plan:  Ongoing pattern congestion and drainage, breakthrough of the last month or so.  Prescription provided for Zyrtec and Flonase to use together for the next couple weeks, then as needed seasonally.  Singulair could be added in future with breakthrough symptoms.  Additional benefit of saline spray, nasal flushing.  Advise concerns.    Orders:  -     cetirizine (zyrTEC) 10 MG tablet; Take 1 tablet by mouth Daily.  Dispense: 30 tablet; Refill: 3  -     fluticasone (FLONASE) 50 MCG/ACT nasal spray; 2 sprays into the nostril(s) as directed by provider Daily.  Dispense: 15.8 mL; Refill: 3    Other orders  -     losartan (Cozaar) 100 MG tablet; Take 1 tablet by mouth Daily.  Dispense: 30 tablet; Refill: 1          Follow Up  Return in about 1 month (around 7/19/2023) for Annual physical.    Osbaldo Valdez MD

## 2023-08-03 RX ORDER — CHLORTHALIDONE 25 MG/1
TABLET ORAL
Qty: 30 TABLET | Refills: 0 | Status: SHIPPED | OUTPATIENT
Start: 2023-08-03

## 2023-08-03 RX ORDER — AMLODIPINE BESYLATE 5 MG/1
TABLET ORAL
Qty: 30 TABLET | Refills: 0 | Status: SHIPPED | OUTPATIENT
Start: 2023-08-03

## 2023-08-29 RX ORDER — AMLODIPINE BESYLATE 5 MG/1
TABLET ORAL
Qty: 30 TABLET | Refills: 0 | Status: SHIPPED | OUTPATIENT
Start: 2023-08-29

## 2023-10-06 RX ORDER — AMLODIPINE BESYLATE 5 MG/1
TABLET ORAL
Qty: 30 TABLET | Refills: 0 | Status: SHIPPED | OUTPATIENT
Start: 2023-10-06

## 2023-10-13 RX ORDER — CHLORTHALIDONE 25 MG/1
25 TABLET ORAL DAILY
Qty: 30 TABLET | Refills: 0 | OUTPATIENT
Start: 2023-10-13

## 2023-10-18 DIAGNOSIS — F32.A ANXIETY AND DEPRESSION: ICD-10-CM

## 2023-10-18 DIAGNOSIS — F41.9 ANXIETY AND DEPRESSION: ICD-10-CM

## 2023-10-18 RX ORDER — ESCITALOPRAM OXALATE 20 MG/1
20 TABLET ORAL DAILY
Qty: 30 TABLET | Refills: 1 | Status: SHIPPED | OUTPATIENT
Start: 2023-10-18

## 2023-10-18 RX ORDER — CHLORTHALIDONE 25 MG/1
25 TABLET ORAL DAILY
Qty: 30 TABLET | Refills: 0 | OUTPATIENT
Start: 2023-10-18

## 2023-10-18 NOTE — TELEPHONE ENCOUNTER
Caller: Vickie Harris    Relationship: Self    Best call back number:  [phone]     Requested Prescriptions:   Requested Prescriptions     Pending Prescriptions Disp Refills    escitalopram (LEXAPRO) 20 MG tablet 30 tablet 1     Sig: Take 1 tablet by mouth Daily.        Pharmacy where request should be sent: [facility]ACY #[id number] - [place] [address] [address]EXT TO [facility] - [phone]  - [phone]      Last office visit with prescribing clinician: 3/29/2023   Last telemedicine visit with prescribing clinician: Visit date not found   Next office visit with prescribing clinician: 11/2/2023     Additional details provided by patient: HAS BEEN OUT A FEW DAYS.    Does the patient have less than a 3 day supply:  [x] Yes  [] No    Would you like a call back once the refill request has been completed: [] Yes [x] No    If the office needs to give you a call back, can they leave a voicemail: [] Yes [x] No    Isaac Wang Rep   10/18/23 16:24 EDT

## 2023-11-21 RX ORDER — AMLODIPINE BESYLATE 5 MG/1
5 TABLET ORAL DAILY
Qty: 90 TABLET | Refills: 0 | Status: SHIPPED | OUTPATIENT
Start: 2023-11-21

## 2023-11-21 NOTE — TELEPHONE ENCOUNTER
Caller: Vickie Harris    Relationship: Self    Best call back number: [phone]    Requested Prescriptions:   Requested Prescriptions     Pending Prescriptions Disp Refills    amLODIPine (NORVASC) 5 MG tablet 30 tablet 0     Sig: Take 1 tablet by mouth Daily.        Pharmacy where request should be sent: [facility]/PHARMACY #[id number] - [place] [address] [address]EXT TO [facility] - [phone]  - [phone]      Last office visit with prescribing clinician: 3/29/2023   Last telemedicine visit with prescribing clinician: Visit date not found   Next office visit with prescribing clinician: 12/20/2023     Additional details provided by patient:     Does the patient have less than a 3 day supply:  [] Yes  [x] No    Isaac Downing Rep   11/21/23 13:43 EST

## 2023-11-29 DIAGNOSIS — F32.A ANXIETY AND DEPRESSION: ICD-10-CM

## 2023-11-29 DIAGNOSIS — F41.9 ANXIETY AND DEPRESSION: ICD-10-CM

## 2023-11-29 DIAGNOSIS — J30.1 SEASONAL ALLERGIC RHINITIS DUE TO POLLEN: ICD-10-CM

## 2023-11-29 RX ORDER — ESCITALOPRAM OXALATE 20 MG/1
20 TABLET ORAL DAILY
Qty: 30 TABLET | Refills: 0 | Status: SHIPPED | OUTPATIENT
Start: 2023-11-29

## 2023-11-29 RX ORDER — CETIRIZINE HYDROCHLORIDE 10 MG/1
10 TABLET ORAL DAILY
Qty: 30 TABLET | Refills: 3 | Status: SHIPPED | OUTPATIENT
Start: 2023-11-29

## 2023-11-29 NOTE — TELEPHONE ENCOUNTER
Has cancelled the last 4 visit I will send in a refill if she does not keep the appointment no more refills will be given

## 2023-11-30 RX ORDER — CHLORTHALIDONE 25 MG/1
TABLET ORAL
Qty: 90 TABLET | Refills: 0 | Status: SHIPPED | OUTPATIENT
Start: 2023-11-30

## 2023-12-22 ENCOUNTER — OFFICE VISIT (OUTPATIENT)
Dept: FAMILY MEDICINE CLINIC | Facility: CLINIC | Age: 23
End: 2023-12-22
Payer: COMMERCIAL

## 2023-12-22 VITALS
SYSTOLIC BLOOD PRESSURE: 124 MMHG | BODY MASS INDEX: 45.99 KG/M2 | DIASTOLIC BLOOD PRESSURE: 82 MMHG | TEMPERATURE: 97.9 F | WEIGHT: 293 LBS | HEART RATE: 86 BPM | OXYGEN SATURATION: 99 % | HEIGHT: 67 IN

## 2023-12-22 DIAGNOSIS — I10 ESSENTIAL HYPERTENSION: ICD-10-CM

## 2023-12-22 DIAGNOSIS — F32.A ANXIETY AND DEPRESSION: Primary | ICD-10-CM

## 2023-12-22 DIAGNOSIS — E66.01 MORBID (SEVERE) OBESITY DUE TO EXCESS CALORIES: ICD-10-CM

## 2023-12-22 DIAGNOSIS — H66.001 NON-RECURRENT ACUTE SUPPURATIVE OTITIS MEDIA OF RIGHT EAR WITHOUT SPONTANEOUS RUPTURE OF TYMPANIC MEMBRANE: ICD-10-CM

## 2023-12-22 DIAGNOSIS — F41.9 ANXIETY AND DEPRESSION: Primary | ICD-10-CM

## 2023-12-22 RX ORDER — AZITHROMYCIN 250 MG/1
TABLET, FILM COATED ORAL
Qty: 6 TABLET | Refills: 0 | Status: SHIPPED | OUTPATIENT
Start: 2023-12-22

## 2023-12-22 NOTE — ASSESSMENT & PLAN NOTE
Complaints of bilateral ear discomfort that has been present for several weeks, right greater than left.  She denies any issues with otic discharge or draining, however on physical exam bilateral bulging tympanic membranes with diffuse erythema noted.  -Patient with well-documented penicillin allergy, will treat empirically with azithromycin.  Also advised benefits of continuance of daily Zyrtec and Flonase

## 2023-12-22 NOTE — ASSESSMENT & PLAN NOTE
Blood pressure remains well-controlled on losartan 100 mg daily.  Blood pressure in office today 124/82.

## 2023-12-22 NOTE — PROGRESS NOTES
Office Note     Name: Vickie Harris    : 2000     MRN: [id number]     Chief Complaint  rx follow up    Subjective     History of Present Illness:  Vickie Harris is a 23 y.o. female who presents today for follow-up on chronic conditions including hypertension, morbid obesity and anxiety/depression.  Patient has struggled with her weight her whole life.  She is currently working towards dietary modification.  Stating that she realizes she is emotionally driven eater and is trying to minimally work on correcting that behavior.  She is also now working on the campus of [facility] which is requiring her to walk more.  Patient reports ongoing mood stability on current dosing of Lexapro.  She does state that December and January are generally hard months for her as it is the anniversary of the death of her father.  Her blood pressure is well-controlled in office today, 124/82.  She notes well-controlled blood pressure when taken at home as well.  She is planning on scheduling her annual physical exam before leaving today.  She does have some complaints of bilateral ear discomfort that has been present for several weeks.  No issues with otic discharge or draining.  She has no further complaints or concerns today    Review of Systems   Constitutional:  Negative for activity change and fatigue.   HENT:  Positive for ear pain, postnasal drip and sinus pressure. Negative for ear discharge.    Respiratory:  Negative for cough, chest tightness, shortness of breath and wheezing.    Cardiovascular:  Negative for chest pain, palpitations and leg swelling.   Gastrointestinal:  Negative for abdominal pain, constipation, diarrhea, nausea and vomiting.   Endocrine: Negative for polydipsia and polyuria.   Genitourinary:  Negative for difficulty urinating and hematuria.   Musculoskeletal:  Negative for arthralgias and myalgias.   Neurological:  Negative for dizziness, weakness, light-headedness and  "headache.       Objective     Past Medical History:   Diagnosis Date    Acanthosis nigricans     COVID-19     Gastritis     secondary to NSAIDs    Influenza due to unidentified influenza virus with other respiratory manifestations     Major depressive disorder, single episode, unspecified     Morbid obesity     Obstructive sleep apnea on CPAP     Other conjunctivitis     Prediabetes     Viral infection, unspecified      Past Surgical History:   Procedure Laterality Date    TONSILLECTOMY AND ADENOIDECTOMY       Family History   Problem Relation Age of Onset    Heart failure Father     Obesity Father     Diabetes Other     Hypertension Other        Vital Signs  /82 (BP Location: Left arm, Patient Position: Sitting, Cuff Size: Large Adult)   Pulse 86   Temp 97.9 °F (36.6 °C) (Temporal)   Ht 170.2 cm (67\")   Wt (!) 254 kg (560 lb)   SpO2 99%   BMI 87.71 kg/m²   Estimated body mass index is 87.71 kg/m² as calculated from the following:    Height as of this encounter: 170.2 cm (67\").    Weight as of this encounter: 254 kg (560 lb).    Physical Exam  Vitals reviewed.   Constitutional:       Appearance: Normal appearance. She is obese.   HENT:      Head: Normocephalic and atraumatic.      Right Ear: Ear canal and external ear normal. A middle ear effusion is present. Tympanic membrane is erythematous and bulging.      Left Ear: Ear canal and external ear normal. A middle ear effusion is present.      Nose: Nose normal.      Mouth/Throat:      Pharynx: Oropharynx is clear.   Eyes:      Conjunctiva/sclera: Conjunctivae normal.   Neck:      Comments: + acanthosis nigricans  Cardiovascular:      Rate and Rhythm: Normal rate and regular rhythm.      Pulses: Normal pulses.      Heart sounds: Normal heart sounds.   Pulmonary:      Effort: Pulmonary effort is normal.      Breath sounds: Normal breath sounds.   Abdominal:      General: Bowel sounds are normal.      Palpations: Abdomen is soft.   Musculoskeletal:      " Cervical back: Neck supple.   Skin:     General: Skin is warm and dry.   Neurological:      Mental Status: She is alert and oriented to person, place, and time.               POCT Results (if applicable):  Results for orders placed or performed in visit on 06/19/23   POC Urinalysis Dipstick    Specimen: Urine   Result Value Ref Range    Color Yellow Yellow, Straw, Dark Yellow, Kerry    Clarity, UA Cloudy (A) Clear    Glucose, UA Negative (A) Negative mg/dL    Bilirubin Negative Negative    Ketones, UA Negative Negative    Specific Gravity  1.015 1.005 - 1.030    Blood, UA 1+ (A) Negative    pH, Urine 6.5 5.0 - 8.0    Protein, POC Trace (A) Negative mg/dL    Urobilinogen, UA 0.2 E.U./dL Normal, 0.2 E.U./dL    Leukocytes Trace (A) Negative    Nitrite, UA Negative Negative            Assessment and Plan     Diagnoses and all orders for this visit:    1. Anxiety and depression (Primary)  Assessment & Plan:  Patient reports ongoing mood stability on current dosing of Lexapro.  She does state that December and January are generally hard months for her as it is the anniversary of the death of her father.   -Continue lexapro 20mg daily      2. Essential hypertension  Assessment & Plan:   Blood pressure remains well-controlled on losartan 100 mg daily.  Blood pressure in office today 124/82.      3. Morbid (severe) obesity due to excess calories  Assessment & Plan:  Patient has struggled with her weight her whole life.  She is currently working towards dietary modification.  Stating that she realizes she is emotionally driven eater and is trying to minimally work on correcting that behavior.  She is also now working on the campus of [employer] which is requiring her to walk more.  Discussed multiple other options to assist with weight loss including pharmacologic management with turns appetite as well as different types of bariatric intervention.  Patient states that her current weight she is not comfortable  with the idea of undergoing anesthesia for bariatric intervention.  She is going to make appointment for annual physical at which time we will further investigate pharmacologic assistance.      4. Non-recurrent acute suppurative otitis media of right ear without spontaneous rupture of tympanic membrane  Assessment & Plan:  Complaints of bilateral ear discomfort that has been present for several weeks, right greater than left.  She denies any issues with otic discharge or draining, however on physical exam bilateral bulging tympanic membranes with diffuse erythema noted.  -Patient with well-documented penicillin allergy, will treat empirically with azithromycin.  Also advised benefits of continuance of daily Zyrtec and Flonase      Other orders  -     azithromycin (Zithromax Z-Jeffery) 250 MG tablet; Take 2 tablets by mouth on day 1, then 1 tablet daily on days 2-5  Dispense: 6 tablet; Refill: 0      Class 3 Severe Obesity (BMI >=40). Obesity-related health conditions include the following: hypertension. Obesity is worsening. BMI is is above average; BMI management plan is completed. We discussed portion control, increasing exercise, and pharmacologic options including tirzepatide and bariatric intervention .        Vaccine Counseling:  “Discussed risks/benefits to vaccination, reviewed components of the vaccine, discussed VIS, discussed informed consent, informed consent obtained. Patient/Parent was allowed to accept or refuse vaccine. Questions answered to satisfactory state of patient/Parent. We reviewed typical age appropriate and seasonally appropriate vaccinations. Reviewed immunization history and updated state vaccination form as needed. Patient was counseled on HPV    Advanced Care Planning:   Patient does not have an advance directive, declines further assistance.      Follow Up  No follow-ups on file.    GABRIELA Livingston

## 2023-12-22 NOTE — ASSESSMENT & PLAN NOTE
Patient reports ongoing mood stability on current dosing of Lexapro.  She does state that December and January are generally hard months for her as it is the anniversary of the death of her father.   -Continue lexapro 20mg daily

## 2023-12-22 NOTE — ASSESSMENT & PLAN NOTE
Patient has struggled with her weight her whole life.  She is currently working towards dietary modification.  Stating that she realizes she is emotionally driven eater and is trying to minimally work on correcting that behavior.  She is also now working on the campus of [facility] which is requiring her to walk more.  Discussed multiple other options to assist with weight loss including pharmacologic management with turns appetite as well as different types of bariatric intervention.  Patient states that her current weight she is not comfortable with the idea of undergoing anesthesia for bariatric intervention.  She is going to make appointment for annual physical at which time we will further investigate pharmacologic assistance.

## 2023-12-28 ENCOUNTER — PATIENT ROUNDING (BHMG ONLY) (OUTPATIENT)
Dept: FAMILY MEDICINE CLINIC | Facility: CLINIC | Age: 23
End: 2023-12-28
Payer: COMMERCIAL

## 2023-12-28 NOTE — PROGRESS NOTES
.A [employer] message has been sent to the patient for patient rounding with [facility].  
49

## 2024-02-01 DIAGNOSIS — F41.9 ANXIETY AND DEPRESSION: ICD-10-CM

## 2024-02-01 DIAGNOSIS — F32.A ANXIETY AND DEPRESSION: ICD-10-CM

## 2024-02-01 RX ORDER — ESCITALOPRAM OXALATE 20 MG/1
20 TABLET ORAL DAILY
Qty: 30 TABLET | Refills: 0 | Status: SHIPPED | OUTPATIENT
Start: 2024-02-01

## 2024-02-01 NOTE — TELEPHONE ENCOUNTER
Caller: Vickie Harris    Relationship: Self    Best call back number: [phone]     Requested Prescriptions:   Requested Prescriptions     Pending Prescriptions Disp Refills    escitalopram (LEXAPRO) 20 MG tablet 30 tablet 0     Sig: Take 1 tablet by mouth Daily.        Pharmacy where request should be sent: [facility]/PHARMACY #[id number] - [place] [address] [address] [facility]EXT TO [facility] - [phone]  - [phone]     Last office visit with prescribing clinician: 12/22/2023   Last telemedicine visit with prescribing clinician: Visit date not found   Next office visit with prescribing clinician: Visit date not found     Additional details provided by patient: PT IS OUT OF RX. SHE ACCIDENTALLY MAY HAVE THROWN IT OUT.    Does the patient have less than a 3 day supply:  [x] Yes  [] No    Would you like a call back once the refill request has been completed: [] Yes [x] No    If the office needs to give you a call back, can they leave a voicemail: [] Yes [x] No    Isaac George Rep   02/01/24 15:32 EST

## 2024-02-19 RX ORDER — AMLODIPINE BESYLATE 5 MG/1
5 TABLET ORAL DAILY
Qty: 90 TABLET | Refills: 0 | Status: SHIPPED | OUTPATIENT
Start: 2024-02-19

## 2024-02-26 RX ORDER — CHLORTHALIDONE 25 MG/1
TABLET ORAL
Qty: 90 TABLET | Refills: 0 | Status: SHIPPED | OUTPATIENT
Start: 2024-02-26

## 2024-02-27 DIAGNOSIS — F41.9 ANXIETY AND DEPRESSION: ICD-10-CM

## 2024-02-27 DIAGNOSIS — F32.A ANXIETY AND DEPRESSION: ICD-10-CM

## 2024-02-27 DIAGNOSIS — J30.1 SEASONAL ALLERGIC RHINITIS DUE TO POLLEN: ICD-10-CM

## 2024-02-27 RX ORDER — ESCITALOPRAM OXALATE 20 MG/1
20 TABLET ORAL DAILY
Qty: 30 TABLET | Refills: 0 | Status: SHIPPED | OUTPATIENT
Start: 2024-02-27

## 2024-02-27 RX ORDER — FLUTICASONE PROPIONATE 50 MCG
SPRAY, SUSPENSION (ML) NASAL
Qty: 16 ML | Refills: 3 | Status: SHIPPED | OUTPATIENT
Start: 2024-02-27

## 2024-03-05 ENCOUNTER — OFFICE VISIT (OUTPATIENT)
Dept: FAMILY MEDICINE CLINIC | Facility: CLINIC | Age: 24
End: 2024-03-05
Payer: COMMERCIAL

## 2024-03-05 VITALS — TEMPERATURE: 100.9 F | WEIGHT: 293 LBS | BODY MASS INDEX: 45.99 KG/M2 | HEIGHT: 67 IN

## 2024-03-05 DIAGNOSIS — U07.1 COVID-19: ICD-10-CM

## 2024-03-05 DIAGNOSIS — J02.0 STREP PHARYNGITIS: ICD-10-CM

## 2024-03-05 DIAGNOSIS — J02.9 SORE THROAT: Primary | ICD-10-CM

## 2024-03-05 LAB
EXPIRATION DATE: ABNORMAL
EXPIRATION DATE: ABNORMAL
FLUAV AG UPPER RESP QL IA.RAPID: NOT DETECTED
FLUBV AG UPPER RESP QL IA.RAPID: NOT DETECTED
INTERNAL CONTROL: ABNORMAL
INTERNAL CONTROL: ABNORMAL
Lab: 7933
Lab: ABNORMAL
S PYO AG THROAT QL: POSITIVE
SARS-COV-2 AG UPPER RESP QL IA.RAPID: DETECTED

## 2024-03-05 PROCEDURE — 99213 OFFICE O/P EST LOW 20 MIN: CPT | Performed by: NURSE PRACTITIONER

## 2024-03-05 PROCEDURE — 87880 STREP A ASSAY W/OPTIC: CPT | Performed by: NURSE PRACTITIONER

## 2024-03-05 RX ORDER — BROMPHENIRAMINE MALEATE, PSEUDOEPHEDRINE HYDROCHLORIDE, AND DEXTROMETHORPHAN HYDROBROMIDE 2; 30; 10 MG/5ML; MG/5ML; MG/5ML
10 SYRUP ORAL 4 TIMES DAILY PRN
Qty: 200 ML | Refills: 0 | Status: SHIPPED | OUTPATIENT
Start: 2024-03-05

## 2024-03-05 RX ORDER — ONDANSETRON 8 MG/1
8 TABLET, ORALLY DISINTEGRATING ORAL EVERY 8 HOURS PRN
Qty: 15 TABLET | Refills: 0 | Status: SHIPPED | OUTPATIENT
Start: 2024-03-05

## 2024-03-05 RX ORDER — PREDNISONE 20 MG/1
40 TABLET ORAL DAILY
Qty: 14 TABLET | Refills: 0 | Status: SHIPPED | OUTPATIENT
Start: 2024-03-05 | End: 2024-03-12

## 2024-03-05 RX ORDER — AZITHROMYCIN 500 MG/1
500 TABLET, FILM COATED ORAL DAILY
Status: CANCELLED | OUTPATIENT
Start: 2024-03-05

## 2024-03-05 RX ORDER — AZITHROMYCIN 250 MG/1
TABLET, FILM COATED ORAL
Qty: 6 TABLET | Refills: 0 | Status: SHIPPED | OUTPATIENT
Start: 2024-03-05

## 2024-03-05 NOTE — PROGRESS NOTES
"    Office Note     Name: Vickie Harris    : 2000     MRN: [id number]     Chief Complaint  Diarrhea, Headache, Fever, Shortness of Breath, Nasal Congestion, and Cough    Subjective     History of Present Illness:  Vickie Harris is a 23 y.o. female who presents today for symptoms that started      Review of Systems    Objective     Past Medical History:   Diagnosis Date    Acanthosis nigricans     COVID-19     Gastritis     secondary to NSAIDs    Influenza due to unidentified influenza virus with other respiratory manifestations     Major depressive disorder, single episode, unspecified     Morbid obesity     Obstructive sleep apnea on CPAP     Other conjunctivitis     Prediabetes     Viral infection, unspecified      Past Surgical History:   Procedure Laterality Date    TONSILLECTOMY AND ADENOIDECTOMY       Family History   Problem Relation Age of Onset    Heart failure Father     Obesity Father     Diabetes Other     Hypertension Other        Vital Signs  Temp (!) 100.9 °F (38.3 °C) (Temporal)   Ht 170.2 cm (67\")   Wt (!) 254 kg (560 lb)   BMI 87.71 kg/m²   Estimated body mass index is 87.71 kg/m² as calculated from the following:    Height as of this encounter: 170.2 cm (67\").    Weight as of this encounter: 254 kg (560 lb).  Facility age limit for growth %holly is 20 years.    Physical Exam     {The following data was reviewed by (Optional):75697}  {Ambulatory Labs (Optional):94565}  {Data reviewed (Optional):83592:::1}     POCT Results (if applicable):  Results for orders placed or performed in visit on 24   Covid-19 + Flu A&B AG, Veritor    Specimen: Swab   Result Value Ref Range    SARS Antigen Detected (A) Not Detected, Presumptive Negative    Influenza A Antigen JUSTIN Not Detected Not Detected    Influenza B Antigen JUSTIN Not Detected Not Detected    Internal Control Passed Passed    Lot Number [id number]     Expiration Date     POC Rapid Strep A    Specimen: Swab   Result " Value Ref Range    Rapid Strep A Screen Positive (A) Negative, VALID, INVALID, Not Performed    Internal Control Passed Passed    Lot Number [id number]     Expiration Date 7,142,025             Assessment and Plan     Diagnoses and all orders for this visit:    1. Sore throat (Primary)  -     Covid-19 + Flu A&B AG, Veritor  -     POC Rapid Strep A    2. COVID-19    3. Strep pharyngitis           {Time Spent (Optional):49683}    Vaccine Counseling:  {RBWIMMCOUNSEL (Optional):78462}    Advanced Care Planning:   {Advance Directive Status:96754}    Smoking Cessation:   {time:64845}    Follow Up  No follow-ups on file.    Mckenna Rea, APRN

## 2024-03-05 NOTE — ASSESSMENT & PLAN NOTE
Patient testing positive for COVID-19 in office today, negative for influenza.  Patient is candidate for antiviral Paxlovid therapy due to severe morbid obesity. Discussed possible side effects including nausea.  Patient is not on any contraindicated medications that we will need holding while on Paxlovid.  Otherwise symptom management for cough and congestion with prescribed Bromfed cough syrup times daily as needed.  Patient also advised to continue use of daily Flonase.  Patient is being given prednisone for chest tightness and bronchial inflammation.  Patient advised on current CDC guidelines stating 5 days of strict quarantine from onset of symptoms.  After initial 5-day quarantine as long as symptoms are improving and patient feels up to it may return to normal activity with continued mask wearing for 5 more days.  Despite decreased appetite encouraged to push fluids and get plenty of rest.

## 2024-03-05 NOTE — PROGRESS NOTES
Office Note     Name: Vickie Harris    : 2000     MRN: [id number]     Chief Complaint  Diarrhea, Headache, Fever, Shortness of Breath, Nasal Congestion, and Cough    Subjective     History of Present Illness:  Vickie Harris is a 23 y.o. female who presents today for acute complaints of diarrhea, headache, fever, shortness of breath, nasal congestion, sore throat and cough.  Patient states her symptoms developed approximately 48 hours ago.  She initially thought she was suffering from common allergy symptoms with a mild scratchy throat.  Her symptoms have continued to progressively worsened since onset.  She has taken over-the-counter cold and cough medications without any benefit.  She has been febrile with max temp noted to be 101.5 earlier this morning.  Her fever initially responded well to ibuprofen, however on arrival to office temperature back up to 100.9.  Patient has diffuse myalgias and extreme fatigue.  She states her cough is dry in nature but sounds wet.  Denies any respiratory distress.  She feels as though at times she will have wheezing and chest tightness.  She has no further complaints or concerns    Review of Systems   Constitutional:  Positive for chills, fatigue and fever.   HENT:  Positive for ear pain, sinus pressure and sore throat. Negative for ear discharge.    Eyes:  Negative for visual disturbance.   Respiratory:  Positive for cough, chest tightness and shortness of breath. Negative for wheezing.    Cardiovascular:  Negative for chest pain, palpitations and leg swelling.   Gastrointestinal:  Positive for diarrhea and nausea. Negative for abdominal pain and vomiting.   Endocrine: Negative for polydipsia and polyuria.   Musculoskeletal:  Positive for myalgias.   Skin:  Negative for rash.   Neurological:  Positive for headache. Negative for dizziness and weakness.       Objective     Past Medical History:   Diagnosis Date    Acanthosis nigricans     COVID-19     Gastritis      "secondary to NSAIDs    Influenza due to unidentified influenza virus with other respiratory manifestations     Major depressive disorder, single episode, unspecified     Morbid obesity     Obstructive sleep apnea on CPAP     Other conjunctivitis     Prediabetes     Viral infection, unspecified      Past Surgical History:   Procedure Laterality Date    TONSILLECTOMY AND ADENOIDECTOMY       Family History   Problem Relation Age of Onset    Heart failure Father     Obesity Father     Diabetes Other     Hypertension Other        Vital Signs  Temp (!) 100.9 °F (38.3 °C) (Temporal)   Ht 170.2 cm (67\")   Wt (!) 254 kg (560 lb)   BMI 87.71 kg/m²   Estimated body mass index is 87.71 kg/m² as calculated from the following:    Height as of this encounter: 170.2 cm (67\").    Weight as of this encounter: 254 kg (560 lb).  Facility age limit for growth %holly is 20 years.    Physical Exam  Vitals reviewed.   Constitutional:       Appearance: She is obese.   HENT:      Head: Normocephalic and atraumatic.      Right Ear: Ear canal and external ear normal. A middle ear effusion is present. Tympanic membrane is erythematous. Tympanic membrane is not perforated, retracted or bulging.      Left Ear: Ear canal and external ear normal. A middle ear effusion is present. Tympanic membrane is erythematous. Tympanic membrane is not perforated, retracted or bulging.      Ears:      Comments: Bilateral serous effusions noted     Nose: Congestion present.      Mouth/Throat:      Pharynx: Oropharynx is clear. Posterior oropharyngeal erythema present.   Eyes:      Conjunctiva/sclera: Conjunctivae normal.      Pupils: Pupils are equal, round, and reactive to light.   Cardiovascular:      Rate and Rhythm: Normal rate and regular rhythm.      Pulses: Normal pulses.      Heart sounds: Normal heart sounds.   Pulmonary:      Effort: Pulmonary effort is normal. No respiratory distress.      Breath sounds: Rhonchi present. No wheezing.   Abdominal:     "  General: Bowel sounds are normal. There is no distension.      Palpations: Abdomen is soft.      Tenderness: There is no abdominal tenderness.   Musculoskeletal:      Cervical back: Neck supple.   Skin:     General: Skin is warm and dry.   Neurological:      Mental Status: She is alert and oriented to person, place, and time.             POCT Results (if applicable):  Results for orders placed or performed in visit on 03/05/24   Covid-19 + Flu A&B AG, Veritor    Specimen: Swab   Result Value Ref Range    SARS Antigen Detected (A) Not Detected, Presumptive Negative    Influenza A Antigen JUSTIN Not Detected Not Detected    Influenza B Antigen JUSTIN Not Detected Not Detected    Internal Control Passed Passed    Lot Number [id number]     Expiration Date 1,172,025    POC Rapid Strep A    Specimen: Swab   Result Value Ref Range    Rapid Strep A Screen Positive (A) Negative, VALID, INVALID, Not Performed    Internal Control Passed Passed    Lot Number [id number]     Expiration Date 7,142,025             Assessment and Plan     Diagnoses and all orders for this visit:    1. Sore throat (Primary)  -     Covid-19 + Flu A&B AG, Veritor  -     POC Rapid Strep A    2. COVID-19  Assessment & Plan:  Patient testing positive for COVID-19 in office today, negative for influenza.  Patient is candidate for antiviral Paxlovid therapy due to severe morbid obesity. Discussed possible side effects including nausea.  Patient is not on any contraindicated medications that we will need holding while on Paxlovid.  Otherwise symptom management for cough and congestion with prescribed Bromfed cough syrup times daily as needed.  Patient also advised to continue use of daily Flonase.  Patient is being given prednisone for chest tightness and bronchial inflammation.  Patient advised on current CDC guidelines stating 5 days of strict quarantine from onset of symptoms.  After initial 5-day quarantine as long as symptoms are improving and patient feels up to  it may return to normal activity with continued mask wearing for 5 more days.  Despite decreased appetite encouraged to push fluids and get plenty of rest.    Orders:  -     Nirmatrelvir & Ritonavir, 300mg/100mg, (PAXLOVID) 20 x 150 MG & 10 x 100MG tablet therapy pack tablet; Take 3 tablets by mouth 2 (Two) Times a Day for 5 days.  Dispense: 30 tablet; Refill: 0  -     brompheniramine-pseudoephedrine-DM 30-2-10 MG/5ML syrup; Take 10 mL by mouth 4 (Four) Times a Day As Needed for Congestion or Cough.  Dispense: 200 mL; Refill: 0  -     predniSONE (DELTASONE) 20 MG tablet; Take 2 tablets by mouth Daily for 7 days.  Dispense: 14 tablet; Refill: 0    3. Strep pharyngitis  Assessment & Plan:  In addition to COVID-19 patient also testing positive for strep pharyngitis.  She has documented penicillin allergy, will treat empirically with azithromycin Z-Jeffery as directed.  Patient will need new toothbrush in 3 to 4 days.  Given work excuse for both COVID and strep pharyngitis.      Other orders  -     ondansetron ODT (ZOFRAN-ODT) 8 MG disintegrating tablet; Place 1 tablet on the tongue Every 8 (Eight) Hours As Needed for Nausea or Vomiting.  Dispense: 15 tablet; Refill: 0  -     azithromycin (Zithromax Z-Jeffery) 250 MG tablet; Take 2 tablets by mouth on day 1, then 1 tablet daily on days 2-5  Dispense: 6 tablet; Refill: 0             Follow Up  No follow-ups on file.    Mckenna Rea, APRN

## 2024-03-05 NOTE — ASSESSMENT & PLAN NOTE
In addition to COVID-19 patient also testing positive for strep pharyngitis.  She has documented penicillin allergy, will treat empirically with azithromycin Z-Jeffery as directed.  Patient will need new toothbrush in 3 to 4 days.  Given work excuse for both COVID and strep pharyngitis.

## 2024-03-06 ENCOUNTER — TELEPHONE (OUTPATIENT)
Dept: FAMILY MEDICINE CLINIC | Facility: CLINIC | Age: 24
End: 2024-03-06
Payer: COMMERCIAL

## 2024-03-06 NOTE — TELEPHONE ENCOUNTER
Caller: Vickie Harris    Relationship: Self    Best call back number: [phone]    Which medication are you concerned about: PT CALLED STATED THAT THEE COUGH SYRUP THAT WAS CALLED INTO [facility]  IS NO ABLE TO FILLED DUE TO THE . PT WILL LIKE TO KNOW WHAT DR SUGGEST.      [facility]/pharmacy #[id number] - [place], [place] - [address] AT NEXT TO [facility] - [phone]  - [phone] FX

## 2024-03-06 NOTE — TELEPHONE ENCOUNTER
Called and left a message advising patient that she could use an OTC medication. Advised her if she has any questions or concerns to call our office

## 2024-03-10 DIAGNOSIS — J30.1 SEASONAL ALLERGIC RHINITIS DUE TO POLLEN: ICD-10-CM

## 2024-03-11 RX ORDER — FLUTICASONE PROPIONATE 50 MCG
SPRAY, SUSPENSION (ML) NASAL
Qty: 48 ML | Refills: 1 | Status: SHIPPED | OUTPATIENT
Start: 2024-03-11

## 2024-03-13 RX ORDER — LOSARTAN POTASSIUM 100 MG/1
100 TABLET ORAL DAILY
Qty: 30 TABLET | Refills: 1 | Status: SHIPPED | OUTPATIENT
Start: 2024-03-13

## 2024-03-13 NOTE — TELEPHONE ENCOUNTER
Caller: Vickie Harris    Relationship: Self    Best call back number: [phone]    Requested Prescriptions:     losartan (COZAAR) 100 MG tablet          Pharmacy where request should be sent:  [facility]/pharmacy #[id number] - [place], [place] - [address] AT NEXT TO [facility] - [phone]  - [phone]         Last office visit with prescribing clinician: 3/5/2024   Last telemedicine visit with prescribing clinician: Visit date not found   Next office visit with prescribing clinician: Visit date not found       Does the patient have less than a 3 day supply:  [x] Yes  [] No    Would you like a call back once the refill request has been completed: [] Yes [x] No    If the office needs to give you a call back, can they leave a voicemail: [] Yes [x] No    Isaac Roberson Rep   03/13/24 09:02 EDT

## 2024-03-31 DIAGNOSIS — F41.9 ANXIETY AND DEPRESSION: ICD-10-CM

## 2024-03-31 DIAGNOSIS — F32.A ANXIETY AND DEPRESSION: ICD-10-CM

## 2024-04-01 RX ORDER — ESCITALOPRAM OXALATE 20 MG/1
20 TABLET ORAL DAILY
Qty: 30 TABLET | Refills: 0 | Status: SHIPPED | OUTPATIENT
Start: 2024-04-01

## 2024-04-23 DIAGNOSIS — J30.1 SEASONAL ALLERGIC RHINITIS DUE TO POLLEN: ICD-10-CM

## 2024-04-23 RX ORDER — CETIRIZINE HYDROCHLORIDE 10 MG/1
10 TABLET ORAL DAILY
Qty: 90 TABLET | Refills: 1 | Status: SHIPPED | OUTPATIENT
Start: 2024-04-23

## 2024-05-02 ENCOUNTER — TELEPHONE (OUTPATIENT)
Dept: FAMILY MEDICINE CLINIC | Facility: CLINIC | Age: 24
End: 2024-05-02
Payer: COMMERCIAL

## 2024-05-08 RX ORDER — CHLORTHALIDONE 25 MG/1
TABLET ORAL
Qty: 90 TABLET | Refills: 0 | Status: SHIPPED | OUTPATIENT
Start: 2024-05-08

## 2024-05-12 DIAGNOSIS — F32.A ANXIETY AND DEPRESSION: ICD-10-CM

## 2024-05-12 DIAGNOSIS — F41.9 ANXIETY AND DEPRESSION: ICD-10-CM

## 2024-05-13 RX ORDER — ESCITALOPRAM OXALATE 20 MG/1
20 TABLET ORAL DAILY
Qty: 30 TABLET | Refills: 0 | Status: SHIPPED | OUTPATIENT
Start: 2024-05-13

## 2024-05-13 RX ORDER — LOSARTAN POTASSIUM 100 MG/1
100 TABLET ORAL DAILY
Qty: 30 TABLET | Refills: 1 | Status: SHIPPED | OUTPATIENT
Start: 2024-05-13

## 2024-05-23 RX ORDER — AMLODIPINE BESYLATE 5 MG/1
5 TABLET ORAL DAILY
Qty: 90 TABLET | Refills: 1 | Status: SHIPPED | OUTPATIENT
Start: 2024-05-23

## 2024-06-16 DIAGNOSIS — F32.A ANXIETY AND DEPRESSION: ICD-10-CM

## 2024-06-16 DIAGNOSIS — F41.9 ANXIETY AND DEPRESSION: ICD-10-CM

## 2024-06-17 RX ORDER — ESCITALOPRAM OXALATE 20 MG/1
20 TABLET ORAL DAILY
Qty: 30 TABLET | Refills: 0 | Status: SHIPPED | OUTPATIENT
Start: 2024-06-17

## 2024-07-10 ENCOUNTER — TELEPHONE (OUTPATIENT)
Dept: FAMILY MEDICINE CLINIC | Facility: CLINIC | Age: 24
End: 2024-07-10
Payer: COMMERCIAL

## 2024-07-10 NOTE — TELEPHONE ENCOUNTER
We do not have records, she was last seen on 03/2024 and that was for a sick visit. I called patient and left a voicemail to see if she has requested Areoflow

## 2024-07-10 NOTE — TELEPHONE ENCOUNTER
Caller: [facility]    Relationship: Other    Best call back number: [phone]     What form or medical record are you requesting: OFFICE NOTES SUPPORTING INCONVENIENCE SUPPLIES     Who is requesting this form or medical record from you: JUNITO WITH [facility]    How would you like to receive the form or medical records (pick-up, mail, fax): FAX  If fax, what is the fax number: [phone]      Timeframe paperwork needed: ASAP PLEASE     Additional notes: [facility]O WITH [facility] ADVISED THEY FAXED A REQUEST FOR OFFICE NOTES ON 06/19/2024 AND 07/02/2024.    PLEASE CALL AND ADVISE

## 2024-07-15 DIAGNOSIS — F32.A ANXIETY AND DEPRESSION: ICD-10-CM

## 2024-07-15 DIAGNOSIS — F41.9 ANXIETY AND DEPRESSION: ICD-10-CM

## 2024-07-15 RX ORDER — ESCITALOPRAM OXALATE 20 MG/1
20 TABLET ORAL DAILY
Qty: 30 TABLET | Refills: 0 | Status: SHIPPED | OUTPATIENT
Start: 2024-07-15

## 2024-07-15 RX ORDER — LOSARTAN POTASSIUM 100 MG/1
100 TABLET ORAL DAILY
Qty: 30 TABLET | Refills: 1 | Status: SHIPPED | OUTPATIENT
Start: 2024-07-15

## 2024-08-05 RX ORDER — CHLORTHALIDONE 25 MG/1
TABLET ORAL
Qty: 90 TABLET | Refills: 0 | Status: SHIPPED | OUTPATIENT
Start: 2024-08-05

## 2024-08-23 ENCOUNTER — TELEMEDICINE (OUTPATIENT)
Dept: FAMILY MEDICINE CLINIC | Facility: CLINIC | Age: 24
End: 2024-08-23
Payer: COMMERCIAL

## 2024-08-23 DIAGNOSIS — I10 ESSENTIAL HYPERTENSION: Primary | ICD-10-CM

## 2024-08-23 DIAGNOSIS — H57.9 ITCHY EYES: ICD-10-CM

## 2024-08-23 DIAGNOSIS — E66.01 MORBID (SEVERE) OBESITY DUE TO EXCESS CALORIES: ICD-10-CM

## 2024-08-23 PROCEDURE — 99214 OFFICE O/P EST MOD 30 MIN: CPT | Performed by: NURSE PRACTITIONER

## 2024-08-23 PROCEDURE — 1159F MED LIST DOCD IN RCRD: CPT | Performed by: NURSE PRACTITIONER

## 2024-08-23 PROCEDURE — 1160F RVW MEDS BY RX/DR IN RCRD: CPT | Performed by: NURSE PRACTITIONER

## 2024-08-23 RX ORDER — OLOPATADINE HYDROCHLORIDE 1 MG/ML
1 SOLUTION/ DROPS OPHTHALMIC 2 TIMES DAILY
Qty: 5 ML | Refills: 12 | Status: SHIPPED | OUTPATIENT
Start: 2024-08-23

## 2024-08-27 PROBLEM — H57.9 ITCHY EYES: Status: ACTIVE | Noted: 2024-08-27

## 2024-08-27 NOTE — ASSESSMENT & PLAN NOTE
Patient's (There is no height or weight on file to calculate BMI.) indicates that they are morbidly/severely obese (BMI > 40 or > 35 with obesity - related health condition) with health conditions that include hypertension . Weight is unchanged. BMI  is above average; BMI management plan is completed. We discussed portion control and increasing exercise.

## 2024-08-27 NOTE — ASSESSMENT & PLAN NOTE
Patient has not been checking her blood pressure recently, last time in office losartan 100 mg daily it was providing adequate benefit with a blood pressure 124/82.

## 2024-08-27 NOTE — PROGRESS NOTES
Telehealth Visit     Date: 2024   Patient Name: Vickie Harris  : 2000   MRN: [id number]     Chief Complaint:  No chief complaint on file.      This provider is located at the [facility] in [place], [place]. The patient is being seen remotely via telehealth at their home address in [place] [facility] [place], and stated they are in a secure environment for this session. The patient's condition being diagnosed/treated is appropriate for telemedicine. The provider identified herself as well as her credentials. The patient, and/or patients guardian, consent to be seen remotely, and when consent is given they understand that the consent allows for patient identifiable information to be sent to a third party as needed. They may refuse to be seen remotely at any time. The electronic data is encrypted and password protected, and the patient and/or guardian has been advised of the potential risks to privacy not withstanding such measures.    You have chosen to receive care through a telehealth visit. Do you consent to use a video/audio connection for your medical care today? Yes    History of Present Illness: Vickie Harris is a 23 y.o. female who is being seen today after developing some eye itchiness over the last several days.  Patient states this has been a common problem for her over the last several years due to her allergies.  Patient states that she is taking daily Flonase and antihistamine cetirizine, however these medications have not helped diffuse bilateral eye itching.  She denies any redness or crusting.  She does note she has been sneezing more frequently as well.  She denies any fever, chills or cough, no shortness of breath.  Patient is also followed for chronic conditions including hypertension, obesity, anxiety and depression. Patient reports ongoing mood stability on current dosing of Lexapro. She does state that December and January are generally hard months for her as it is  the anniversary of the death of her father.       Subjective      Review of Systems   Constitutional:  Negative for chills, fatigue and fever.   HENT:  Positive for postnasal drip, rhinorrhea and sneezing. Negative for sore throat.    Eyes:  Positive for itching. Negative for redness.   Respiratory:  Negative for cough, chest tightness, shortness of breath and wheezing.    Cardiovascular:  Negative for chest pain and leg swelling.   Gastrointestinal:  Negative for abdominal pain, constipation, diarrhea, nausea and vomiting.   Musculoskeletal:  Positive for arthralgias. Negative for myalgias.   Neurological:  Negative for dizziness, weakness, light-headedness and headaches.       I have reviewed and the following portions of the patient's history were updated as appropriate: past family history, past medical history, past social history, past surgical history and problem list.    Past Medical History:   Diagnosis Date    Acanthosis nigricans     COVID-19     Gastritis     secondary to NSAIDs    Influenza due to unidentified influenza virus with other respiratory manifestations     Major depressive disorder, single episode, unspecified     Morbid obesity     Obstructive sleep apnea on CPAP     Other conjunctivitis     Prediabetes     Viral infection, unspecified        Past Surgical History:   Procedure Laterality Date    TONSILLECTOMY AND ADENOIDECTOMY         Social History     Socioeconomic History    Marital status: Single   Tobacco Use    Smoking status: Never    Smokeless tobacco: Never   Vaping Use    Vaping status: Never Used   Substance and Sexual Activity    Alcohol use: Not Currently    Drug use: Not Currently    Sexual activity: Not Currently         Current Outpatient Medications:     amLODIPine (NORVASC) 5 MG tablet, TAKE 1 TABLET BY MOUTH EVERY DAY, Disp: 90 tablet, Rfl: 1    azithromycin (Zithromax Z-Jeffery) 250 MG tablet, Take 2 tablets by mouth on day 1, then 1 tablet daily on days 2-5, Disp: 6 tablet,  Rfl: 0    brompheniramine-pseudoephedrine-DM 30-2-10 MG/5ML syrup, Take 10 mL by mouth 4 (Four) Times a Day As Needed for Congestion or Cough., Disp: 200 mL, Rfl: 0    cetirizine (zyrTEC) 10 MG tablet, TAKE 1 TABLET BY MOUTH EVERY DAY, Disp: 90 tablet, Rfl: 1    chlorthalidone (HYGROTON) 25 MG tablet, TAKE 1 TABLET BY MOUTH EVERY DAY, Disp: 90 tablet, Rfl: 0    escitalopram (LEXAPRO) 20 MG tablet, TAKE 1 TABLET BY MOUTH EVERY DAY, Disp: 30 tablet, Rfl: 0    fluticasone (FLONASE) 50 MCG/ACT nasal spray, SPRAY 2 SPRAYS INTO EACH NOSTRIL AS DIRECTED BY PROVIDER DAILY, Disp: 48 mL, Rfl: 1    losartan (COZAAR) 100 MG tablet, TAKE 1 TABLET BY MOUTH EVERY DAY, Disp: 30 tablet, Rfl: 1    olopatadine (PATANOL) 0.1 % ophthalmic solution, Administer 1 drop to both eyes 2 (Two) Times a Day., Disp: 5 mL, Rfl: 12    ondansetron ODT (ZOFRAN-ODT) 8 MG disintegrating tablet, Place 1 tablet on the tongue Every 8 (Eight) Hours As Needed for Nausea or Vomiting., Disp: 15 tablet, Rfl: 0    Objective     Physical Exam:  Vital Signs: There were no vitals filed for this visit.  There is no height or weight on file to calculate BMI.    Physical Exam  Vitals reviewed.   Constitutional:       Appearance: Normal appearance. She is obese.   HENT:      Mouth/Throat:      Pharynx: Oropharynx is clear. Posterior oropharyngeal erythema present.   Eyes:      Conjunctiva/sclera: Conjunctivae normal.   Pulmonary:      Effort: Pulmonary effort is normal. No respiratory distress.   Musculoskeletal:      Cervical back: Neck supple.   Skin:     General: Skin is dry.   Neurological:      Mental Status: She is alert and oriented to person, place, and time.         Assessment / Plan      Diagnoses and all orders for this visit:    1. Essential hypertension (Primary)  Assessment & Plan:  Patient has not been checking her blood pressure recently, last time in office losartan 100 mg daily it was providing adequate benefit with a blood pressure 124/82.      2.  Morbid (severe) obesity due to excess calories  Assessment & Plan:  Patient's (There is no height or weight on file to calculate BMI.) indicates that they are morbidly/severely obese (BMI > 40 or > 35 with obesity - related health condition) with health conditions that include hypertension . Weight is unchanged. BMI  is above average; BMI management plan is completed. We discussed portion control and increasing exercise.       3. Itchy eyes  Assessment & Plan:  being seen today after developing some eye itchiness over the last several days.  Patient states this has been a common problem for her over the last several years due to her allergies.  Patient states that she is taking daily Flonase and antihistamine cetirizine, however these medications have not helped diffuse bilateral eye itching.  She denies any redness or crusting.  She does note she has been sneezing more frequently as well.  Patient states in the past she has been given antihistamine eyedrops by her previous PCP with excellent benefit.  Will send prescription for Patanol 1 drop both eyes twice daily.  Patient will advise if no improvement      Other orders  -     olopatadine (PATANOL) 0.1 % ophthalmic solution; Administer 1 drop to both eyes 2 (Two) Times a Day.  Dispense: 5 mL; Refill: 12         Follow Up:   No follow-ups on file.    Any medications prescribed have been sent electronically to   [facility]/pharmacy #[id number] - [place], [place] - [address] [place] [address] AT [address]EXT TO [facility] - [phone]  - [phone] [address] [address]  Phone: [phone] Fax: [phone]      30 minutes were spent reviewing the patient's questionnaire, formulating a treatment plan, and relaying information to the patient via [employer].    GABRIELA Livingston    Part of this note may be an electronic transcription/translation of spoken language to printed text using the Dragon Dictation System.

## 2024-08-27 NOTE — ASSESSMENT & PLAN NOTE
Patient reports ongoing mood stability on current dosing of Lexapro. She does state that December and January are generally hard months for her as it is the anniversary of the death of her father.   -Continue Lexapro 20 mg daily

## 2024-08-27 NOTE — ASSESSMENT & PLAN NOTE
being seen today after developing some eye itchiness over the last several days.  Patient states this has been a common problem for her over the last several years due to her allergies.  Patient states that she is taking daily Flonase and antihistamine cetirizine, however these medications have not helped diffuse bilateral eye itching.  She denies any redness or crusting.  She does note she has been sneezing more frequently as well.  Patient states in the past she has been given antihistamine eyedrops by her previous PCP with excellent benefit.  Will send prescription for Patanol 1 drop both eyes twice daily.  Patient will advise if no improvement

## 2024-08-29 ENCOUNTER — TELEMEDICINE (OUTPATIENT)
Dept: FAMILY MEDICINE CLINIC | Facility: CLINIC | Age: 24
End: 2024-08-29
Payer: COMMERCIAL

## 2024-08-29 DIAGNOSIS — H57.9 ITCHY EYES: ICD-10-CM

## 2024-08-29 DIAGNOSIS — E66.01 MORBID (SEVERE) OBESITY DUE TO EXCESS CALORIES: ICD-10-CM

## 2024-08-29 DIAGNOSIS — F41.9 ANXIETY AND DEPRESSION: ICD-10-CM

## 2024-08-29 DIAGNOSIS — I10 ESSENTIAL HYPERTENSION: Primary | ICD-10-CM

## 2024-08-29 DIAGNOSIS — F32.A ANXIETY AND DEPRESSION: ICD-10-CM

## 2024-08-29 DIAGNOSIS — J30.1 SEASONAL ALLERGIC RHINITIS DUE TO POLLEN: ICD-10-CM

## 2024-08-29 PROCEDURE — 99214 OFFICE O/P EST MOD 30 MIN: CPT | Performed by: NURSE PRACTITIONER

## 2024-08-29 PROCEDURE — 1159F MED LIST DOCD IN RCRD: CPT | Performed by: NURSE PRACTITIONER

## 2024-08-29 PROCEDURE — 1160F RVW MEDS BY RX/DR IN RCRD: CPT | Performed by: NURSE PRACTITIONER

## 2024-08-29 RX ORDER — CETIRIZINE HYDROCHLORIDE 10 MG/1
10 TABLET ORAL DAILY
Qty: 90 TABLET | Refills: 2 | Status: SHIPPED | OUTPATIENT
Start: 2024-08-29

## 2024-08-29 RX ORDER — FLUTICASONE PROPIONATE 50 MCG
2 SPRAY, SUSPENSION (ML) NASAL DAILY
Qty: 15.8 ML | Refills: 1 | Status: SHIPPED | OUTPATIENT
Start: 2024-08-29

## 2024-08-29 RX ORDER — AMLODIPINE BESYLATE 5 MG/1
5 TABLET ORAL DAILY
Qty: 90 TABLET | Refills: 2 | Status: SHIPPED | OUTPATIENT
Start: 2024-08-29

## 2024-08-29 RX ORDER — ESCITALOPRAM OXALATE 20 MG/1
20 TABLET ORAL DAILY
Qty: 90 TABLET | Refills: 2 | Status: SHIPPED | OUTPATIENT
Start: 2024-08-29

## 2024-08-29 RX ORDER — CHLORTHALIDONE 25 MG/1
25 TABLET ORAL DAILY
Qty: 90 TABLET | Refills: 2 | Status: SHIPPED | OUTPATIENT
Start: 2024-08-29

## 2024-08-29 RX ORDER — MONTELUKAST SODIUM 10 MG/1
10 TABLET ORAL NIGHTLY
Qty: 90 TABLET | Refills: 2 | Status: SHIPPED | OUTPATIENT
Start: 2024-08-29

## 2024-08-29 NOTE — PROGRESS NOTES
"    Office Note     Name: Vickie Harris    : 2000     MRN: [id number]     Chief Complaint  No chief complaint on file.    Subjective     History of Present Illness:  Vickie Harris is a 23 y.o. female who presents today for     Review of Systems   Constitutional:  Negative for chills, fatigue and fever.   HENT:  Positive for postnasal drip, rhinorrhea and sinus pressure.    Eyes:  Negative for itching.   Respiratory:  Negative for cough, chest tightness, shortness of breath and wheezing.    Cardiovascular:  Negative for chest pain, palpitations and leg swelling.   Gastrointestinal:  Negative for abdominal pain, constipation, diarrhea, nausea and vomiting.   Endocrine: Negative for polydipsia and polyuria.   Musculoskeletal:  Positive for arthralgias.   Neurological:  Negative for dizziness, weakness, light-headedness and headache.   Psychiatric/Behavioral:  Negative for agitation, depressed mood and stress. The patient is not nervous/anxious.        Objective     Past Medical History:   Diagnosis Date    Acanthosis nigricans     COVID-19     Gastritis     secondary to NSAIDs    Influenza due to unidentified influenza virus with other respiratory manifestations     Major depressive disorder, single episode, unspecified     Morbid obesity     Obstructive sleep apnea on CPAP     Other conjunctivitis     Prediabetes     Viral infection, unspecified      Past Surgical History:   Procedure Laterality Date    TONSILLECTOMY AND ADENOIDECTOMY       Family History   Problem Relation Age of Onset    Heart failure Father     Obesity Father     Diabetes Other     Hypertension Other        Vital Signs  There were no vitals taken for this visit.  Estimated body mass index is 87.71 kg/m² as calculated from the following:    Height as of 3/5/24: 170.2 cm (67\").    Weight as of 3/5/24: 254 kg (560 lb).  No height and weight on file for this encounter.    Physical Exam  Vitals reviewed.   Constitutional:       Appearance: " She is obese.   HENT:      Nose: Congestion present.      Mouth/Throat:      Pharynx: Oropharynx is clear. Posterior oropharyngeal erythema present.   Eyes:      Conjunctiva/sclera: Conjunctivae normal.   Pulmonary:      Effort: Pulmonary effort is normal. No respiratory distress.   Musculoskeletal:      Cervical back: Neck supple.   Skin:     General: Skin is dry.   Neurological:      Mental Status: She is alert and oriented to person, place, and time.             POCT Results (if applicable):  Results for orders placed or performed in visit on 03/05/24   Covid-19 + Flu A&B AG, Veritor    Specimen: Swab   Result Value Ref Range    SARS Antigen Detected (A) Not Detected, Presumptive Negative    Influenza A Antigen JUSTIN Not Detected Not Detected    Influenza B Antigen JUSTIN Not Detected Not Detected    Internal Control Passed Passed    Lot Number [id number]     Expiration Date 1,172,025    POC Rapid Strep A    Specimen: Swab   Result Value Ref Range    Rapid Strep A Screen Positive (A) Negative, VALID, INVALID, Not Performed    Internal Control Passed Passed    Lot Number [id number]     Expiration Date 7,142,025             Assessment and Plan     Diagnoses and all orders for this visit:    1. Seasonal allergic rhinitis due to pollen  -     cetirizine (zyrTEC) 10 MG tablet; Take 1 tablet by mouth Daily.  Dispense: 90 tablet; Refill: 2  -     fluticasone (FLONASE) 50 MCG/ACT nasal spray; 2 sprays into the nostril(s) as directed by provider Daily.  Dispense: 15.8 mL; Refill: 1    2. Anxiety and depression  -     escitalopram (LEXAPRO) 20 MG tablet; Take 1 tablet by mouth Daily.  Dispense: 90 tablet; Refill: 2    Other orders  -     amLODIPine (NORVASC) 5 MG tablet; Take 1 tablet by mouth Daily.  Dispense: 90 tablet; Refill: 2  -     chlorthalidone (HYGROTON) 25 MG tablet; Take 1 tablet by mouth Daily.  Dispense: 90 tablet; Refill: 2  -     montelukast (Singulair) 10 MG tablet; Take 1 tablet by mouth Every Night.  Dispense: 90  tablet; Refill: 2           Follow Up  No follow-ups on file.    Mckenna Rea, APRN

## 2024-09-03 NOTE — ASSESSMENT & PLAN NOTE
Patient has not been checking her blood pressure recently, last time in office losartan 100 mg daily it was providing adequate benefit with a blood pressure 124/82.   Continue amlodipine 5mg daily, losartan 100mg daily and chlorthalidone 25mg daily

## 2024-09-03 NOTE — PROGRESS NOTES
Telehealth Visit     Date: 2024   Patient Name: Vickie Harris  : 2000   MRN: [id number]     Chief Complaint:  No chief complaint on file.      This provider is located at the [facility] in [place], [place]. The patient is being seen remotely via telehealth at their home address in [facility], and stated they are in a secure environment for this session. The patient's condition being diagnosed/treated is appropriate for telemedicine. The provider identified herself as well as her credentials. The patient, and/or patients guardian, consent to be seen remotely, and when consent is given they understand that the consent allows for patient identifiable information to be sent to a third party as needed. They may refuse to be seen remotely at any time. The electronic data is encrypted and password protected, and the patient and/or guardian has been advised of the potential risks to privacy not withstanding such measures.    You have chosen to receive care through a telehealth visit. Do you consent to use a video/audio connection for your medical care today? Yes    History of Present Illness: Vickie Harris is a 23 y.o. female who is here today to follow up on chronic conditions including anxiety, depression, hypertension, obesity and seasonal allergies.  Patient seen via telehealth just a few days ago with complaints of bilateral itchy eyes.  Patient related this had been a common problem for her over the last several years due to her allergies.  She maintains a regimen of daily Flonase and cetirizine, however her eye itching persisted.  She was given prescription for Pataday 1 drop both eyes twice daily which has given her resolution to her itchy eyes.  Patient has not been checking her blood pressure at home, currently utilizing amlodipine 5 mg daily and losartan 100 mg daily for control.  Patient reports ongoing mood stability on current dosing of Lexapro, she has disclosed in the  past that December and January are generally difficult for her in regards to mood stability as it is the anniversary of the death of her father.  Patient continues to have some breakthrough allergic rhinitis despite use of daily cetirizine and Flonase.  She has no further complaints or concerns      Subjective      Review of Systems   Constitutional:  Negative for chills, fatigue and fever.   HENT:  Positive for congestion, postnasal drip, rhinorrhea, sinus pressure and sneezing. Negative for sore throat.    Eyes:  Negative for redness and itching.   Respiratory:  Negative for cough, chest tightness, shortness of breath and wheezing.    Cardiovascular:  Negative for chest pain, palpitations and leg swelling.   Gastrointestinal:  Negative for abdominal pain, constipation, diarrhea, nausea and vomiting.   Endocrine: Negative for polydipsia and polyuria.   Genitourinary:  Negative for difficulty urinating, frequency and hematuria.   Musculoskeletal:  Positive for arthralgias.   Neurological:  Negative for dizziness, weakness, light-headedness and headaches.   Hematological:  Does not bruise/bleed easily.   Psychiatric/Behavioral:  Negative for self-injury, sleep disturbance and suicidal ideas. The patient is not nervous/anxious.        I have reviewed and the following portions of the patient's history were updated as appropriate: past family history, past medical history, past social history, past surgical history and problem list.    Past Medical History:   Diagnosis Date    Acanthosis nigricans     COVID-19     Gastritis     secondary to NSAIDs    Influenza due to unidentified influenza virus with other respiratory manifestations     Major depressive disorder, single episode, unspecified     Morbid obesity     Obstructive sleep apnea on CPAP     Other conjunctivitis     Prediabetes     Viral infection, unspecified        Past Surgical History:   Procedure Laterality Date    TONSILLECTOMY AND ADENOIDECTOMY          Social History     Socioeconomic History    Marital status: Single   Tobacco Use    Smoking status: Never    Smokeless tobacco: Never   Vaping Use    Vaping status: Never Used   Substance and Sexual Activity    Alcohol use: Not Currently    Drug use: Not Currently    Sexual activity: Not Currently         Current Outpatient Medications:     amLODIPine (NORVASC) 5 MG tablet, Take 1 tablet by mouth Daily., Disp: 90 tablet, Rfl: 2    cetirizine (zyrTEC) 10 MG tablet, Take 1 tablet by mouth Daily., Disp: 90 tablet, Rfl: 2    chlorthalidone (HYGROTON) 25 MG tablet, Take 1 tablet by mouth Daily., Disp: 90 tablet, Rfl: 2    escitalopram (LEXAPRO) 20 MG tablet, Take 1 tablet by mouth Daily., Disp: 90 tablet, Rfl: 2    fluticasone (FLONASE) 50 MCG/ACT nasal spray, 2 sprays into the nostril(s) as directed by provider Daily., Disp: 15.8 mL, Rfl: 1    azithromycin (Zithromax Z-Jeffery) 250 MG tablet, Take 2 tablets by mouth on day 1, then 1 tablet daily on days 2-5, Disp: 6 tablet, Rfl: 0    brompheniramine-pseudoephedrine-DM 30-2-10 MG/5ML syrup, Take 10 mL by mouth 4 (Four) Times a Day As Needed for Congestion or Cough., Disp: 200 mL, Rfl: 0    losartan (COZAAR) 100 MG tablet, TAKE 1 TABLET BY MOUTH EVERY DAY, Disp: 30 tablet, Rfl: 1    montelukast (Singulair) 10 MG tablet, Take 1 tablet by mouth Every Night., Disp: 90 tablet, Rfl: 2    olopatadine (PATANOL) 0.1 % ophthalmic solution, Administer 1 drop to both eyes 2 (Two) Times a Day., Disp: 5 mL, Rfl: 12    ondansetron ODT (ZOFRAN-ODT) 8 MG disintegrating tablet, Place 1 tablet on the tongue Every 8 (Eight) Hours As Needed for Nausea or Vomiting., Disp: 15 tablet, Rfl: 0    Objective     Physical Exam:  Vital Signs: There were no vitals filed for this visit.  There is no height or weight on file to calculate BMI.    Physical Exam  Vitals reviewed.   Constitutional:       Appearance: Normal appearance. She is obese.   HENT:      Nose: Congestion present.      Mouth/Throat:       Pharynx: Oropharynx is clear.   Eyes:      Conjunctiva/sclera: Conjunctivae normal.   Pulmonary:      Effort: Pulmonary effort is normal. No respiratory distress.   Skin:     General: Skin is dry.   Neurological:      Mental Status: She is alert and oriented to person, place, and time.   Psychiatric:         Mood and Affect: Mood normal.         Behavior: Behavior normal.         Thought Content: Thought content normal.         Judgment: Judgment normal.         Assessment / Plan      Diagnoses and all orders for this visit:    1. Essential hypertension (Primary)  Assessment & Plan:  Patient has not been checking her blood pressure recently, last time in office losartan 100 mg daily it was providing adequate benefit with a blood pressure 124/82.   Continue amlodipine 5mg daily, losartan 100mg daily and chlorthalidone 25mg daily      2. Seasonal allergic rhinitis due to pollen  Assessment & Plan:   Patient continues to have some breakthrough allergic rhinitis despite use of daily cetirizine and Flonase.  These include profuse sinus congestion and dry cough.  Will add nightly Singulair for breakthrough symptoms.  We also discussed benefits of saline nasal spray.    Orders:  -     cetirizine (zyrTEC) 10 MG tablet; Take 1 tablet by mouth Daily.  Dispense: 90 tablet; Refill: 2  -     fluticasone (FLONASE) 50 MCG/ACT nasal spray; 2 sprays into the nostril(s) as directed by provider Daily.  Dispense: 15.8 mL; Refill: 1    3. Anxiety and depression  Assessment & Plan:  Patient reports ongoing mood stability on current dosing of Lexapro. She does state that December and January are generally hard months for her as it is the anniversary of the death of her father.   -Continue Lexapro 20 mg daily    Orders:  -     escitalopram (LEXAPRO) 20 MG tablet; Take 1 tablet by mouth Daily.  Dispense: 90 tablet; Refill: 2    4. Itchy eyes  Assessment & Plan:  Patient seen via telehealth just a few days ago with complaints of  bilateral itchy eyes.  Patient related this had been a common problem for her over the last several years due to her allergies.  She maintains a regimen of daily Flonase and cetirizine, however her eye itching persisted.  She was given prescription for Pataday 1 drop both eyes twice daily which has given her resolution to her itchy eyes.       5. Morbid (severe) obesity due to excess calories  Assessment & Plan:  Patient's (There is no height or weight on file to calculate BMI.) indicates that they are morbidly/severely obese (BMI > 40 or > 35 with obesity - related health condition) with health conditions that include hypertension . Weight is unchanged. BMI  is above average; BMI management plan is completed. We discussed portion control and increasing exercise.       Other orders  -     amLODIPine (NORVASC) 5 MG tablet; Take 1 tablet by mouth Daily.  Dispense: 90 tablet; Refill: 2  -     chlorthalidone (HYGROTON) 25 MG tablet; Take 1 tablet by mouth Daily.  Dispense: 90 tablet; Refill: 2  -     montelukast (Singulair) 10 MG tablet; Take 1 tablet by mouth Every Night.  Dispense: 90 tablet; Refill: 2         Follow Up:   No follow-ups on file.    Any medications prescribed have been sent electronically to   [facility]/pharm[facility] #[id number] - [place], [place] - [address] [place] [address] AT NEXT TO [facility] - [phone]  - [phone] [address] [address]  Phone: [phone] Fax: [phone]      30 minutes were spent reviewing the patient's questionnaire, formulating a treatment plan, and relaying information to the patient via [employer].    GABRIELA Livingston    Part of this note may be an electronic transcription/translation of spoken language to printed text using the Dragon Dictation System.

## 2024-09-04 NOTE — ASSESSMENT & PLAN NOTE
Patient continues to have some breakthrough allergic rhinitis despite use of daily cetirizine and Flonase.  These include profuse sinus congestion and dry cough.  Will add nightly Singulair for breakthrough symptoms.  We also discussed benefits of saline nasal spray.

## 2024-09-04 NOTE — ASSESSMENT & PLAN NOTE
Patient seen via telehealth just a few days ago with complaints of bilateral itchy eyes.  Patient related this had been a common problem for her over the last several years due to her allergies.  She maintains a regimen of daily Flonase and cetirizine, however her eye itching persisted.  She was given prescription for Pataday 1 drop both eyes twice daily which has given her resolution to her itchy eyes.

## 2024-09-27 ENCOUNTER — TELEMEDICINE (OUTPATIENT)
Dept: FAMILY MEDICINE CLINIC | Facility: CLINIC | Age: 24
End: 2024-09-27
Payer: COMMERCIAL

## 2024-09-27 DIAGNOSIS — R30.0 DYSURIA: Primary | ICD-10-CM

## 2024-09-27 PROCEDURE — 99213 OFFICE O/P EST LOW 20 MIN: CPT | Performed by: NURSE PRACTITIONER

## 2024-09-27 RX ORDER — NITROFURANTOIN 25; 75 MG/1; MG/1
100 CAPSULE ORAL 2 TIMES DAILY
Qty: 10 CAPSULE | Refills: 0 | Status: SHIPPED | OUTPATIENT
Start: 2024-09-27

## 2024-10-07 ENCOUNTER — OFFICE VISIT (OUTPATIENT)
Dept: FAMILY MEDICINE CLINIC | Facility: CLINIC | Age: 24
End: 2024-10-07
Payer: COMMERCIAL

## 2024-10-07 VITALS
BODY MASS INDEX: 45.99 KG/M2 | RESPIRATION RATE: 18 BRPM | HEART RATE: 116 BPM | WEIGHT: 293 LBS | HEIGHT: 67 IN | SYSTOLIC BLOOD PRESSURE: 136 MMHG | DIASTOLIC BLOOD PRESSURE: 84 MMHG | OXYGEN SATURATION: 98 % | TEMPERATURE: 97.3 F

## 2024-10-07 DIAGNOSIS — I10 ESSENTIAL HYPERTENSION: ICD-10-CM

## 2024-10-07 DIAGNOSIS — F41.9 ANXIETY AND DEPRESSION: ICD-10-CM

## 2024-10-07 DIAGNOSIS — F32.A ANXIETY AND DEPRESSION: ICD-10-CM

## 2024-10-07 DIAGNOSIS — E66.01 MORBID (SEVERE) OBESITY DUE TO EXCESS CALORIES: ICD-10-CM

## 2024-10-07 DIAGNOSIS — R30.0 DYSURIA: Primary | ICD-10-CM

## 2024-10-07 PROCEDURE — 1159F MED LIST DOCD IN RCRD: CPT | Performed by: NURSE PRACTITIONER

## 2024-10-07 PROCEDURE — 3079F DIAST BP 80-89 MM HG: CPT | Performed by: NURSE PRACTITIONER

## 2024-10-07 PROCEDURE — 3075F SYST BP GE 130 - 139MM HG: CPT | Performed by: NURSE PRACTITIONER

## 2024-10-07 PROCEDURE — 1160F RVW MEDS BY RX/DR IN RCRD: CPT | Performed by: NURSE PRACTITIONER

## 2024-10-07 PROCEDURE — 99214 OFFICE O/P EST MOD 30 MIN: CPT | Performed by: NURSE PRACTITIONER

## 2024-10-07 RX ORDER — NITROFURANTOIN 25; 75 MG/1; MG/1
100 CAPSULE ORAL 2 TIMES DAILY
Qty: 10 CAPSULE | Refills: 0 | OUTPATIENT
Start: 2024-10-07

## 2024-10-07 RX ORDER — CIPROFLOXACIN 500 MG/1
500 TABLET, FILM COATED ORAL 2 TIMES DAILY
Qty: 6 TABLET | Refills: 0 | Status: SHIPPED | OUTPATIENT
Start: 2024-10-07 | End: 2024-10-10

## 2024-10-07 NOTE — PROGRESS NOTES
/     Office Note     Name: Vickie Harris    : 2000     MRN: [id number]     Chief Complaint  Difficulty Urinating    Subjective     History of Present Illness:  Vickie Harris is a 23 y.o. female who presents today for complaints of dysuria. Patient last evaluated by me via telehealth on  with complaints of the same. She was given prescription for macrobid at that time. Patient states that while the medication did give her nausea and abdominal cramping her urinary symptoms seemed to have completely resolved by the end of the macrobid course, however returned about two days ago. Patient describes her pain as suprapubic pressure, burning with urination, urgency and frequency. Patient states she has noticed some pink urine when wiping after voiding. She denies any flank pain, chills or fever. Patient does not have a personal history of nephrolithiasis but states there is strong family history. Patient is also followed for chronic issues including obesity, HTN , anxiety and depression. Bp borderline in office today, 136/84, she does not check her BP at home. atient reports ongoing mood stability on current dosing of Lexapro. She does state that December and January are generally hard months for her as it is the anniversary of the death of her father. No further complaints or concerns      Review of Systems   Constitutional:  Negative for chills, fatigue and fever.   Respiratory:  Negative for cough and shortness of breath.    Cardiovascular:  Negative for chest pain and palpitations.   Gastrointestinal:  Negative for abdominal pain, constipation, diarrhea, nausea and vomiting.   Genitourinary:  Positive for dysuria, frequency, pelvic pressure and urgency. Negative for decreased urine volume, difficulty urinating, flank pain and urinary incontinence.   Musculoskeletal:  Negative for myalgias.       Objective     Past Medical History:   Diagnosis Date    Acanthosis nigricans     COVID-19     Gastritis      "secondary to NSAIDs    Influenza due to unidentified influenza virus with other respiratory manifestations     Major depressive disorder, single episode, unspecified     Morbid obesity     Obstructive sleep apnea on CPAP     Other conjunctivitis     Prediabetes     Viral infection, unspecified      Past Surgical History:   Procedure Laterality Date    TONSILLECTOMY AND ADENOIDECTOMY       Family History   Problem Relation Age of Onset    Heart failure Father     Obesity Father     Diabetes Other     Hypertension Other        /Vital Signs  /84 (BP Location: Left arm, Patient Position: Sitting, Cuff Size: Adult)   Pulse 116   Temp 97.3 °F (36.3 °C) (Temporal)   Resp 18   Ht 170.2 cm (67\")   Wt (!) 254 kg (560 lb)   SpO2 98%   BMI 87.71 kg/m²   Estimated body mass index is 87.71 kg/m² as calculated from the following:    Height as of this encounter: 170.2 cm (67\").    Weight as of this encounter: 254 kg (560 lb).  Facility age limit for growth %holly is 20 years.    Physical Exam  Vitals reviewed.   Constitutional:       Appearance: Normal appearance.   Eyes:      Conjunctiva/sclera: Conjunctivae normal.   Cardiovascular:      Rate and Rhythm: Normal rate and regular rhythm.      Pulses: Normal pulses.      Heart sounds: Normal heart sounds.   Pulmonary:      Effort: Pulmonary effort is normal.      Breath sounds: Normal breath sounds.   Abdominal:      General: Bowel sounds are normal. There is no distension.      Palpations: Abdomen is soft.      Tenderness: There is no abdominal tenderness. There is no right CVA tenderness, left CVA tenderness or guarding.   Skin:     General: Skin is warm and dry.   Neurological:      Mental Status: She is alert and oriented to person, place, and time.             POCT Results (if applicable):  Results for orders placed or performed in visit on 03/05/24   Covid-19 + Flu A&B AG, Veritor    Specimen: Swab   Result Value Ref Range    SARS Antigen Detected (A) Not Detected, " Presumptive Negative    Influenza A Antigen JUSTIN Not Detected Not Detected    Influenza B Antigen JUSTIN Not Detected Not Detected    Internal Control Passed Passed    Lot Number [id number]     Expiration Date 1,172,025    POC Rapid Strep A    Specimen: Swab   Result Value Ref Range    Rapid Strep A Screen Positive (A) Negative, VALID, INVALID, Not Performed    Internal Control Passed Passed    Lot Number [id number]     Expiration Date 7,142,025             Assessment and Plan     Diagnoses and all orders for this visit:    1. Dysuria (Primary)  Assessment & Plan:  Patient last evaluated by me via telehealth on 09/27 with complaints of the same. She was given prescription for macrobid at that time. Patient states that while the medication did give her nausea and abdominal cramping her urinary symptoms seemed to have completely resolved by the end of the macrobid course, however returned about two days ago. Patient describes her pain as suprapubic pressure, burning with urination, urgency and frequency. Patient states she has noticed some pink urine when wiping after voiding. She denies any flank pain, chills or fever.Patient unable to produce urine sample for testing in office today. Given cup to take home and return when able. Given signs of hematuria and current symptoms will treat with cipro. We also discussed possibility of renal stones given family history, give her size imaging would be difficult.     Orders:  -     Cancel: POC Urinalysis Dipstick  -     Cancel: Urine Culture - Urine, Urine, Clean Catch  -     Urine Culture - Urine, Urine, Clean Catch; Future  -     Urine Culture - Urine, Urine, Clean Catch    2. Essential hypertension  Assessment & Plan:  Bp borderline in office today, 136/84, she does not check her BP at home. Currently utilizing amlodipine 5mg daily, chlorthalidone 25mg daily and losartan 100mg daily. Request she check BP at home a few days weekly with goal being less than 130/80 most of the time.  Also advised low sodium diet      3. Morbid (severe) obesity due to excess calories  Assessment & Plan:  Patient's (Body mass index is 87.71 kg/m².) indicates that they are morbidly/severely obese (BMI > 40 or > 35 with obesity - related health condition) with health conditions that include hypertension . Weight is unchanged. BMI  is above average; BMI management plan is completed. We discussed portion control and increasing exercise.       4. Anxiety and depression  Assessment & Plan:  atient reports ongoing mood stability on current dosing of Lexapro. She does state that December and January are generally hard months for her as it is the anniversary of the death of her father.   -Continue Lexapro 20 mg daily      Other orders  -     ciprofloxacin (Cipro) 500 MG tablet; Take 1 tablet by mouth 2 (Two) Times a Day for 3 days.  Dispense: 6 tablet; Refill: 0           Follow Up  No follow-ups on file.    Mckenna Rea, GABRIELA

## 2024-10-08 RX ORDER — LOSARTAN POTASSIUM 100 MG/1
100 TABLET ORAL DAILY
Qty: 30 TABLET | Refills: 1 | Status: SHIPPED | OUTPATIENT
Start: 2024-10-08

## 2024-10-08 NOTE — ASSESSMENT & PLAN NOTE
ana luisa reports ongoing mood stability on current dosing of Lexapro. She does state that December and January are generally hard months for her as it is the anniversary of the death of her father.   -Continue Lexapro 20 mg daily

## 2024-10-08 NOTE — ASSESSMENT & PLAN NOTE
Patient's (Body mass index is 87.71 kg/m².) indicates that they are morbidly/severely obese (BMI > 40 or > 35 with obesity - related health condition) with health conditions that include hypertension . Weight is unchanged. BMI  is above average; BMI management plan is completed. We discussed portion control and increasing exercise.

## 2024-10-08 NOTE — ASSESSMENT & PLAN NOTE
Patient last evaluated by me via telehealth on 09/27 with complaints of the same. She was given prescription for macrobid at that time. Patient states that while the medication did give her nausea and abdominal cramping her urinary symptoms seemed to have completely resolved by the end of the macrobid course, however returned about two days ago. Patient describes her pain as suprapubic pressure, burning with urination, urgency and frequency. Patient states she has noticed some pink urine when wiping after voiding. She denies any flank pain, chills or fever.Patient unable to produce urine sample for testing in office today. Given cup to take home and return when able. Given signs of hematuria and current symptoms will treat with cipro. We also discussed possibility of renal stones given family history, give her size imaging would be difficult.

## 2024-10-08 NOTE — ASSESSMENT & PLAN NOTE
Bp borderline in office today, 136/84, she does not check her BP at home. Currently utilizing amlodipine 5mg daily, chlorthalidone 25mg daily and losartan 100mg daily. Request she check BP at home a few days weekly with goal being less than 130/80 most of the time. Also advised low sodium diet

## 2024-10-09 LAB
CONV .: NORMAL
Lab: NORMAL

## 2024-10-10 ENCOUNTER — TELEPHONE (OUTPATIENT)
Dept: FAMILY MEDICINE CLINIC | Facility: CLINIC | Age: 24
End: 2024-10-10
Payer: COMMERCIAL

## 2024-10-10 DIAGNOSIS — R30.0 DYSURIA: Primary | ICD-10-CM

## 2024-10-10 LAB
BACTERIA UR CULT: NORMAL
BACTERIA UR CULT: NORMAL

## 2024-10-10 NOTE — TELEPHONE ENCOUNTER
----- Message from Mckennaleandra Rea sent at 10/10/2024  9:54 AM EDT -----  Please let Vickie know her urine came back without any bacteria, her symptoms are possibly a renal stone. I would like to send her for a KUB at [facility] at her convenience she can come by the office and get order.

## 2024-11-20 RX ORDER — LOSARTAN POTASSIUM 100 MG/1
100 TABLET ORAL DAILY
Qty: 30 TABLET | Refills: 1 | Status: SHIPPED | OUTPATIENT
Start: 2024-11-20

## 2024-11-25 DIAGNOSIS — J30.1 SEASONAL ALLERGIC RHINITIS DUE TO POLLEN: ICD-10-CM

## 2024-11-25 RX ORDER — FLUTICASONE PROPIONATE 50 MCG
SPRAY, SUSPENSION (ML) NASAL
Qty: 16 G | Refills: 1 | Status: SHIPPED | OUTPATIENT
Start: 2024-11-25

## 2024-12-02 ENCOUNTER — TELEPHONE (OUTPATIENT)
Dept: FAMILY MEDICINE CLINIC | Facility: CLINIC | Age: 24
End: 2024-12-02
Payer: COMMERCIAL

## 2024-12-02 NOTE — TELEPHONE ENCOUNTER
Caller: Vickie Harris    Relationship: Self    Best call back number: [phone]     What is the best time to reach you: ANYTIME     Who are you requesting to speak with (clinical staff, provider,  specific staff member): CLINICAL STAFF    What was the call regarding: PATIENT IS CALLING TO SEE IF SHE CAN SPEAK WITH THE CLINICAL STAFF. SHE SAYS THAT SHE HAS QUESTIONS ABOUT HER MEDICATION. SHE DID NOT WISH TO GIVE ANY OTHER INFORMATION AT THIS TIME.     Is it okay if the provider responds through MyChart: NO

## 2024-12-04 ENCOUNTER — OFFICE VISIT (OUTPATIENT)
Dept: FAMILY MEDICINE CLINIC | Facility: CLINIC | Age: 24
End: 2024-12-04
Payer: COMMERCIAL

## 2024-12-04 VITALS
HEART RATE: 102 BPM | WEIGHT: 293 LBS | BODY MASS INDEX: 45.99 KG/M2 | TEMPERATURE: 97.8 F | HEIGHT: 67 IN | OXYGEN SATURATION: 98 % | RESPIRATION RATE: 16 BRPM

## 2024-12-04 DIAGNOSIS — J02.9 SORE THROAT: Primary | ICD-10-CM

## 2024-12-04 DIAGNOSIS — B34.9 VIRAL SYNDROME: ICD-10-CM

## 2024-12-04 LAB
EXPIRATION DATE: NORMAL
FLUAV AG UPPER RESP QL IA.RAPID: NOT DETECTED
FLUBV AG UPPER RESP QL IA.RAPID: NOT DETECTED
INTERNAL CONTROL: NORMAL
Lab: NORMAL
SARS-COV-2 AG UPPER RESP QL IA.RAPID: NOT DETECTED

## 2024-12-04 PROCEDURE — 87428 SARSCOV & INF VIR A&B AG IA: CPT | Performed by: NURSE PRACTITIONER

## 2024-12-04 PROCEDURE — 99213 OFFICE O/P EST LOW 20 MIN: CPT | Performed by: NURSE PRACTITIONER

## 2024-12-04 PROCEDURE — 1159F MED LIST DOCD IN RCRD: CPT | Performed by: NURSE PRACTITIONER

## 2024-12-04 PROCEDURE — 1160F RVW MEDS BY RX/DR IN RCRD: CPT | Performed by: NURSE PRACTITIONER

## 2024-12-04 RX ORDER — BROMPHENIRAMINE MALEATE, PSEUDOEPHEDRINE HYDROCHLORIDE, AND DEXTROMETHORPHAN HYDROBROMIDE 2; 30; 10 MG/5ML; MG/5ML; MG/5ML
10 SYRUP ORAL 4 TIMES DAILY PRN
Qty: 200 ML | Refills: 0 | Status: SHIPPED | OUTPATIENT
Start: 2024-12-04

## 2024-12-04 RX ORDER — GUAIFENESIN 600 MG/1
1200 TABLET, EXTENDED RELEASE ORAL 2 TIMES DAILY
Qty: 28 TABLET | Refills: 0 | Status: SHIPPED | OUTPATIENT
Start: 2024-12-04 | End: 2024-12-11

## 2024-12-04 NOTE — PROGRESS NOTES
Office Note     Name: Vickie Harris    : 2000     MRN: [id number]     Chief Complaint  Nasal Congestion, Cough, Sore Throat, and Fever    Subjective     History of Present Illness:  Vickie Harris is a 24 y.o. female who presents today for complaints of nasal congestion, sore throat and fever. She has experienced a dry cough as well. These symptoms started three days ago. She has utilized benadryl for her symptoms as well as cough drops and gargling saltwater to sooth her throat.  Patient does have issues with chronic allergic rhinitis.  She takes a regimen of daily Zyrtec as well as Flonase nasal spray.  Patient has not experienced any chills or GI symptoms.  She states that her mucus is very thick and hard to expectorate.  She has no further complaints or concerns today    Review of Systems   Constitutional:  Positive for fatigue. Negative for chills and fever.   HENT:  Positive for congestion, sinus pressure and sore throat. Negative for postnasal drip.    Respiratory:  Positive for cough. Negative for chest tightness, shortness of breath and wheezing.    Cardiovascular:  Negative for chest pain, palpitations and leg swelling.   Gastrointestinal:  Negative for abdominal pain, constipation, diarrhea, nausea and vomiting.   Endocrine: Negative for polydipsia and polyuria.   Musculoskeletal:  Negative for myalgias.   Neurological:  Positive for headache. Negative for dizziness, weakness and light-headedness.       Objective     Past Medical History:   Diagnosis Date    Acanthosis nigricans     COVID-19     Gastritis     secondary to NSAIDs    Influenza due to unidentified influenza virus with other respiratory manifestations     Major depressive disorder, single episode, unspecified     Morbid obesity     Obstructive sleep apnea on CPAP     Other conjunctivitis     Prediabetes     Viral infection, unspecified      Past Surgical History:   Procedure Laterality Date    TONSILLECTOMY AND ADENOIDECTOMY    "    Family History   Problem Relation Age of Onset    Heart failure Father     Obesity Father     Diabetes Other     Hypertension Other        Vital Signs  Pulse 102   Temp 97.8 °F (36.6 °C) (Temporal)   Resp 16   Ht 170.2 cm (67\")   Wt (!) 254 kg (560 lb)   SpO2 98%   BMI 87.71 kg/m²   Estimated body mass index is 87.71 kg/m² as calculated from the following:    Height as of this encounter: 170.2 cm (67\").    Weight as of this encounter: 254 kg (560 lb).  Facility age limit for growth %holly is 20 years.    Physical Exam  Vitals reviewed.   Constitutional:       Appearance: Normal appearance. She is obese.   HENT:      Head: Normocephalic and atraumatic.      Right Ear: Tympanic membrane, ear canal and external ear normal.      Left Ear: Tympanic membrane, ear canal and external ear normal.      Mouth/Throat:      Mouth: Mucous membranes are moist.      Pharynx: Oropharynx is clear. Posterior oropharyngeal erythema present.   Eyes:      Conjunctiva/sclera: Conjunctivae normal.   Cardiovascular:      Rate and Rhythm: Normal rate and regular rhythm.      Pulses: Normal pulses.      Heart sounds: Normal heart sounds.   Pulmonary:      Effort: Pulmonary effort is normal.      Breath sounds: Normal breath sounds.   Abdominal:      General: Bowel sounds are normal.      Palpations: Abdomen is soft.   Musculoskeletal:      Cervical back: Neck supple.   Skin:     General: Skin is warm and dry.   Neurological:      Mental Status: She is alert and oriented to person, place, and time.          POCT Results (if applicable):  Results for orders placed or performed in visit on 12/04/24   Covid-19 + Flu A&B AG, Verivon    Collection Time: 12/04/24  8:54 AM    Specimen: Swab   Result Value Ref Range    SARS Antigen Not Detected Not Detected, Presumptive Negative    Influenza A Antigen JUSTIN Not Detected Not Detected    Influenza B Antigen JUSTIN Not Detected Not Detected    Internal Control Passed Passed    Lot Number [id number]  "    Expiration Date 10,232,025             Assessment and Plan     Diagnoses and all orders for this visit:    1. Sore throat (Primary)  -     Covid-19 + Flu A&B AG, Veritor    2. Viral syndrome  Assessment & Plan:  Patient's COVID, flu and strep testing negative in office today.  Presentation consistent with another viral illness which is common in the community recently.  Will continue symptomatic treatment.  Patient being given prescription for Mucinex 1200 mg twice daily for the next week as well as Bromfed for cough and congestion.  We discussed other measures including coolmist humidifier, ibuprofen or Tylenol as directed.  We discussed signs of secondary bacterial infection including improvement followed by significant worsening of symptoms.  Will advise if no improvement    Orders:  -     guaiFENesin (Mucinex) 600 MG 12 hr tablet; Take 2 tablets by mouth 2 (Two) Times a Day for 7 days.  Dispense: 28 tablet; Refill: 0  -     brompheniramine-pseudoephedrine-DM 30-2-10 MG/5ML syrup; Take 10 mL by mouth 4 (Four) Times a Day As Needed for Congestion or Cough.  Dispense: 200 mL; Refill: 0         Follow Up  No follow-ups on file.    Mckenna Rea, APRN

## 2024-12-07 NOTE — ASSESSMENT & PLAN NOTE
Patient's COVID, flu and strep testing negative in office today.  Presentation consistent with another viral illness which is common in the community recently.  Will continue symptomatic treatment.  Patient being given prescription for Mucinex 1200 mg twice daily for the next week as well as Bromfed for cough and congestion.  We discussed other measures including coolmist humidifier, ibuprofen or Tylenol as directed.  We discussed signs of secondary bacterial infection including improvement followed by significant worsening of symptoms.  Will advise if no improvement

## 2024-12-09 RX ORDER — AZITHROMYCIN 250 MG/1
TABLET, FILM COATED ORAL
Qty: 6 TABLET | Refills: 0 | Status: SHIPPED | OUTPATIENT
Start: 2024-12-09

## 2024-12-26 ENCOUNTER — TELEMEDICINE (OUTPATIENT)
Dept: FAMILY MEDICINE CLINIC | Facility: CLINIC | Age: 24
End: 2024-12-26
Payer: COMMERCIAL

## 2024-12-26 DIAGNOSIS — I10 ESSENTIAL HYPERTENSION: ICD-10-CM

## 2024-12-26 DIAGNOSIS — F41.9 ANXIETY AND DEPRESSION: Primary | ICD-10-CM

## 2024-12-26 DIAGNOSIS — G43.009 MIGRAINE WITHOUT AURA AND WITHOUT STATUS MIGRAINOSUS, NOT INTRACTABLE: ICD-10-CM

## 2024-12-26 DIAGNOSIS — J30.1 SEASONAL ALLERGIC RHINITIS DUE TO POLLEN: ICD-10-CM

## 2024-12-26 DIAGNOSIS — F32.A ANXIETY AND DEPRESSION: Primary | ICD-10-CM

## 2024-12-26 PROCEDURE — 99214 OFFICE O/P EST MOD 30 MIN: CPT | Performed by: NURSE PRACTITIONER

## 2024-12-26 RX ORDER — CETIRIZINE HYDROCHLORIDE 10 MG/1
10 TABLET ORAL DAILY
Qty: 90 TABLET | Refills: 2 | Status: SHIPPED | OUTPATIENT
Start: 2024-12-26

## 2024-12-26 NOTE — ASSESSMENT & PLAN NOTE
Patient has pattern of migraine that includes headache, nausea, light and sound sensitivity when they occur.  She states she has had them more recently with increased stress at work.  Not currently utilizing any prophylactic medications, migraine General Respond well to seclusion in a dark room without any noise.

## 2024-12-26 NOTE — LETTER
December 26, 2024      [facility]  [address] [place] [address] [address] [id number] [phone]          Patient: Vickie Harris   YOB: 2000   Date of Visit: 12/26/2024       To Whom It May Concern:    Vickie Harris was seen at [facility] on 12/26/2024.    Please excuse from work on 12/24/2024.        Sincerely,       Mckenna Rea, APRN

## 2024-12-26 NOTE — PROGRESS NOTES
Telehealth Visit     Date: 2024   Patient Name: Vickie Harris  : 2000   MRN: [id number]     Chief Complaint:  No chief complaint on file.      This provider is located at the [facility] in [place], [place]. The patient is being seen remotely via telehealth at their home address in [place], and stated they are in a secure environment for this session. The patient's condition being diagnosed/treated is appropriate for telemedicine. The provider identified herself as well as her credentials. The patient, and/or patients guardian, consent to be seen remotely, and when consent is given they understand that the consent allows for patient identifiable information to be sent to a third party as needed. They may refuse to be seen remotely at any time. The electronic data is encrypted and password protected, and the patient and/or guardian has been advised of the potential risks to privacy not withstanding such measures.    You have chosen to receive care through a telehealth visit. Do you consent to use a video/audio connection for your medical care today? Yes    History of Present Illness: Vickie Harris is a 24 y.o. female who is being seen today to discuss some increased anxiety.  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  She states that it has become very stressful and subsequently having worsening of her longstanding pattern of anxiety and depression.  She states her stress comes from her manager who often micromanage his bed at the same time will not give detailed descriptions and what she is looking for from her as an employee.  Patient states her constant stress has triggered a migraine, causing her to miss work 2 days ago.  Patient currently utilizes regimen of Lexapro 20 mg daily for her anxiety.  She is also followed for chronic condition of hypertension, she does not have any blood pressure readings  for review today.  Patient also has some complaints in regards to bilateral ankle edema, stating she feels her chlorthalidone is not as effective as it once was.  She has no further complaints or concerns      Subjective      Review of Systems   Constitutional:  Negative for chills, fatigue and fever.   Respiratory:  Negative for cough, chest tightness, shortness of breath and wheezing.    Cardiovascular:  Positive for leg swelling.   Gastrointestinal:  Negative for abdominal pain, diarrhea, nausea and vomiting.   Neurological:  Positive for headaches. Negative for dizziness, weakness and light-headedness.   Psychiatric/Behavioral:  Positive for agitation. Negative for self-injury, sleep disturbance and suicidal ideas. The patient is nervous/anxious.        I have reviewed and the following portions of the patient's history were updated as appropriate: past family history, past medical history, past social history, past surgical history and problem list.    Past Medical History:   Diagnosis Date    Acanthosis nigricans     COVID-19     Gastritis     secondary to NSAIDs    Influenza due to unidentified influenza virus with other respiratory manifestations     Major depressive disorder, single episode, unspecified     Morbid obesity     Obstructive sleep apnea on CPAP     Other conjunctivitis     Prediabetes     Viral infection, unspecified        Past Surgical History:   Procedure Laterality Date    TONSILLECTOMY AND ADENOIDECTOMY         Social History     Socioeconomic History    Marital status: Single   Tobacco Use    Smoking status: Never    Smokeless tobacco: Never   Vaping Use    Vaping status: Never Used   Substance and Sexual Activity    Alcohol use: Not Currently    Drug use: Not Currently    Sexual activity: Not Currently         Current Outpatient Medications:     cetirizine (zyrTEC) 10 MG tablet, Take 1 tablet by mouth Daily., Disp: 90 tablet, Rfl: 2    amLODIPine (NORVASC) 5 MG tablet, Take 1 tablet by  mouth Daily., Disp: 90 tablet, Rfl: 2    azithromycin (Zithromax Z-Jeffery) 250 MG tablet, Take 2 tablets by mouth on day 1, then 1 tablet daily on days 2-5, Disp: 6 tablet, Rfl: 0    brompheniramine-pseudoephedrine-DM 30-2-10 MG/5ML syrup, Take 10 mL by mouth 4 (Four) Times a Day As Needed for Congestion or Cough., Disp: 200 mL, Rfl: 0    chlorthalidone (HYGROTON) 25 MG tablet, Take 1 tablet by mouth Daily., Disp: 90 tablet, Rfl: 2    escitalopram (LEXAPRO) 20 MG tablet, Take 1 tablet by mouth Daily., Disp: 90 tablet, Rfl: 2    fluticasone (FLONASE) 50 MCG/ACT nasal spray, SPRAY 2 SPRAYS INTO EACH NOSTRIL AS DIRECTED BY PROVIDER DAILY, Disp: 16 g, Rfl: 1    losartan (COZAAR) 100 MG tablet, TAKE 1 TABLET BY MOUTH EVERY DAY, Disp: 30 tablet, Rfl: 1    montelukast (Singulair) 10 MG tablet, Take 1 tablet by mouth Every Night., Disp: 90 tablet, Rfl: 2    olopatadine (PATANOL) 0.1 % ophthalmic solution, Administer 1 drop to both eyes 2 (Two) Times a Day., Disp: 5 mL, Rfl: 12    ondansetron ODT (ZOFRAN-ODT) 8 MG disintegrating tablet, Place 1 tablet on the tongue Every 8 (Eight) Hours As Needed for Nausea or Vomiting., Disp: 15 tablet, Rfl: 0    Objective     Physical Exam:  Vital Signs: There were no vitals filed for this visit.  There is no height or weight on file to calculate BMI.    Physical Exam  Vitals reviewed.   Constitutional:       Appearance: She is obese.   Eyes:      Conjunctiva/sclera: Conjunctivae normal.   Pulmonary:      Effort: Pulmonary effort is normal. No respiratory distress.   Skin:     General: Skin is dry.   Neurological:      Mental Status: She is alert and oriented to person, place, and time.         Assessment / Plan      Diagnoses and all orders for this visit:    1. 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   It has become very stressful and subsequently having  worsening of her longstanding pattern of anxiety and depression. High levels of stress are Increasing her migraines.  She is interested in some medication adjustments but we will talk more in person next week during her physical      2. Seasonal allergic rhinitis due to pollen  -     cetirizine (zyrTEC) 10 MG tablet; Take 1 tablet by mouth Daily.  Dispense: 90 tablet; Refill: 2    3. Essential hypertension  Assessment & Plan:  Patient states that she cannot recall why her recent blood pressures have been, currently utilizing regimen of amlodipine 5 mg daily, chlorthalidone 25 mg daily and losartan 100 mg daily.  Patient also has complaints of bilateral ankle edema, could be due to the amlodipine.  She states that she feels her chlorthalidone is not working as well for edema control.  We discussed that this is better evaluated in person, she is going to make appointment for next week for full physical exam.      4. Migraine without aura and without status migrainosus, not intractable  Assessment & Plan:  Patient has pattern of migraine that includes headache, nausea, light and sound sensitivity when they occur.  She states she has had them more recently with increased stress at work.  Not currently utilizing any prophylactic medications, migraine General Respond well to seclusion in a dark room without any noise.                   Follow Up:   No follow-ups on file.    Any medications prescribed have been sent electronically to   [facility][id number] - [place], [place] - [address] [place], [place] [address]CANDI LUCAS AT NEXT TO [facility] - [phone]  - [phone]   [address] [facility] [address]  Phone: [phone] Fax: [phone]      30 minutes were spent reviewing the patient's questionnaire, formulating a treatment plan, and relaying information to the patient via Kauli.    GABRIELA Livingston    Part of this note may be an electronic transcription/translation of spoken language to printed text using the Dragon Dictation  System.

## 2024-12-26 NOTE — ASSESSMENT & PLAN NOTE
Patient states that she cannot recall why her recent blood pressures have been, currently utilizing regimen of amlodipine 5 mg daily, chlorthalidone 25 mg daily and losartan 100 mg daily.  Patient also has complaints of bilateral ankle edema, could be due to the amlodipine.  She states that she feels her chlorthalidone is not working as well for edema control.  We discussed that this is better evaluated in person, she is going to make appointment for next week for full physical exam.

## 2024-12-26 NOTE — ASSESSMENT & PLAN NOTE
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   It has become very stressful and subsequently having worsening of her longstanding pattern of anxiety and depression. High levels of stress are Increasing her migraines.  She is interested in some medication adjustments but we will talk more in person next week during her physical

## 2025-04-07 ENCOUNTER — TELEMEDICINE (OUTPATIENT)
Dept: FAMILY MEDICINE CLINIC | Facility: TELEHEALTH | Age: 25
End: 2025-04-07
Payer: COMMERCIAL

## 2025-04-07 DIAGNOSIS — R39.89 SUSPECTED UTI: Primary | ICD-10-CM

## 2025-04-07 RX ORDER — PHENAZOPYRIDINE HYDROCHLORIDE 200 MG/1
200 TABLET, FILM COATED ORAL 3 TIMES DAILY PRN
Qty: 6 TABLET | Refills: 0 | Status: SHIPPED | OUTPATIENT
Start: 2025-04-07 | End: 2025-04-09

## 2025-04-07 RX ORDER — NITROFURANTOIN 25; 75 MG/1; MG/1
100 CAPSULE ORAL 2 TIMES DAILY
Qty: 10 CAPSULE | Refills: 0 | Status: SHIPPED | OUTPATIENT
Start: 2025-04-07 | End: 2025-04-12

## 2025-04-07 NOTE — PATIENT INSTRUCTIONS
Wipe front to back, increase water to flush the kidneys and avoid bladder irritants such as caffeine and alcohol.    -Warning signs: severe abdominal/pelvic/back pain, fever >101, blood in urine - seek medical attention as soon as possible for a hands on/objective exam and possible labs.     If symptoms worsen or do not improve follow up with your PCP or visit your nearest Urgent Care Center or ER.

## 2025-04-07 NOTE — PROGRESS NOTES
Subjective   Chief Complaint   Patient presents with    Urinary Tract Infection       Vickie Harris is a 24 y.o. female.     History of Present Illness  Patient reports waking up yesterday morning with a sudden urge to urinate, burning with urination, cloudy urine, pelvic aching and a scant amount of blood with wiping.  She feels like she has a UTI.  Urinary Tract Infection   This is a new problem. The current episode started yesterday. The problem occurs constantly. The quality of the pain is described as burning. There has been no fever. There is no history of pyelonephritis. Associated symptoms include frequency, hematuria, hesitancy and urgency. Pertinent negatives include no chills, discharge, flank pain, nausea, possible pregnancy, sweats or vomiting. Treatments tried: azo, increased water.        Allergies   Allergen Reactions    Nsaids Rash    Penicillins Rash     Developed facial rash and caused GI ulceration       Past Medical History:   Diagnosis Date    Acanthosis nigricans     COVID-19     Gastritis     secondary to NSAIDs    Influenza due to unidentified influenza virus with other respiratory manifestations     Major depressive disorder, single episode, unspecified     Morbid obesity     Obstructive sleep apnea on CPAP     Other conjunctivitis     Prediabetes     Viral infection, unspecified        Past Surgical History:   Procedure Laterality Date    TONSILLECTOMY AND ADENOIDECTOMY         Social History     Socioeconomic History    Marital status: Single   Tobacco Use    Smoking status: Never    Smokeless tobacco: Never   Vaping Use    Vaping status: Never Used   Substance and Sexual Activity    Alcohol use: Not Currently    Drug use: Not Currently    Sexual activity: Not Currently       Family History   Problem Relation Age of Onset    Heart failure Father     Obesity Father     Diabetes Other     Hypertension Other          Current Outpatient Medications:     amLODIPine (NORVASC) 5 MG tablet,  Take 1 tablet by mouth Daily., Disp: 90 tablet, Rfl: 2    cetirizine (zyrTEC) 10 MG tablet, Take 1 tablet by mouth Daily., Disp: 90 tablet, Rfl: 2    chlorthalidone (HYGROTON) 25 MG tablet, Take 1 tablet by mouth Daily., Disp: 90 tablet, Rfl: 2    escitalopram (LEXAPRO) 20 MG tablet, Take 1 tablet by mouth Daily., Disp: 90 tablet, Rfl: 2    fluticasone (FLONASE) 50 MCG/ACT nasal spray, SPRAY 2 SPRAYS INTO EACH NOSTRIL AS DIRECTED BY PROVIDER DAILY, Disp: 16 g, Rfl: 1    losartan (COZAAR) 100 MG tablet, TAKE 1 TABLET BY MOUTH EVERY DAY, Disp: 30 tablet, Rfl: 1    montelukast (Singulair) 10 MG tablet, Take 1 tablet by mouth Every Night., Disp: 90 tablet, Rfl: 2    nitrofurantoin, macrocrystal-monohydrate, (Macrobid) 100 MG capsule, Take 1 capsule by mouth 2 (Two) Times a Day for 5 days., Disp: 10 capsule, Rfl: 0    olopatadine (PATANOL) 0.1 % ophthalmic solution, Administer 1 drop to both eyes 2 (Two) Times a Day., Disp: 5 mL, Rfl: 12    phenazopyridine (Pyridium) 200 MG tablet, Take 1 tablet by mouth 3 (Three) Times a Day As Needed for Bladder Spasms or Dysuria for up to 2 days., Disp: 6 tablet, Rfl: 0      Review of Systems   Constitutional:  Negative for chills, diaphoresis, fatigue and fever.   Gastrointestinal:  Negative for abdominal pain, nausea and vomiting.   Genitourinary:  Positive for decreased urine volume, dysuria, frequency, hematuria, hesitancy, pelvic pain, pelvic pressure and urgency. Negative for flank pain, vaginal bleeding, vaginal discharge and vaginal pain.   Musculoskeletal:  Negative for back pain.        There were no vitals filed for this visit.    Objective   Physical Exam  Constitutional:       General: She is not in acute distress.     Appearance: Normal appearance. She is not ill-appearing, toxic-appearing or diaphoretic.   HENT:      Head: Normocephalic.      Mouth/Throat:      Lips: Pink.      Mouth: Mucous membranes are moist.   Pulmonary:      Effort: Pulmonary effort is normal.    Neurological:      Mental Status: She is alert and oriented to person, place, and time.          Procedures     Assessment & Plan   Diagnoses and all orders for this visit:    1. Suspected UTI (Primary)  -     nitrofurantoin, macrocrystal-monohydrate, (Macrobid) 100 MG capsule; Take 1 capsule by mouth 2 (Two) Times a Day for 5 days.  Dispense: 10 capsule; Refill: 0  -     phenazopyridine (Pyridium) 200 MG tablet; Take 1 tablet by mouth 3 (Three) Times a Day As Needed for Bladder Spasms or Dysuria for up to 2 days.  Dispense: 6 tablet; Refill: 0      Wipe front to back, increase water to flush the kidneys and avoid bladder irritants such as caffeine and alcohol.    -Warning signs: severe abdominal/pelvic/back pain, fever >101, blood in urine - seek medical attention as soon as possible for a hands on/objective exam and possible labs.     If symptoms worsen or do not improve follow up with your PCP or visit your nearest Urgent Care Center or ER.      PLAN: Discussed dosing, side effects, recommended other symptomatic care.  Patient should follow up with primary care provider, Urgent Care or ER if symptoms worsen, fail to resolve or other symptoms need attention. Patient/family agree to the above.         GABRIELA Rose       This visit was performed via Telehealth.  This patient has been instructed to follow-up with their primary care provider if their symptoms worsen or the treatment provided does not resolve their illness.

## 2025-05-07 DIAGNOSIS — J30.1 SEASONAL ALLERGIC RHINITIS DUE TO POLLEN: ICD-10-CM

## 2025-05-07 RX ORDER — FLUTICASONE PROPIONATE 50 MCG
SPRAY, SUSPENSION (ML) NASAL
Qty: 16 G | Refills: 1 | Status: SHIPPED | OUTPATIENT
Start: 2025-05-07

## 2025-05-16 ENCOUNTER — TELEMEDICINE (OUTPATIENT)
Dept: FAMILY MEDICINE CLINIC | Facility: TELEHEALTH | Age: 25
End: 2025-05-16
Payer: COMMERCIAL

## 2025-05-16 DIAGNOSIS — N39.0 URINARY TRACT INFECTION WITHOUT HEMATURIA, SITE UNSPECIFIED: Primary | ICD-10-CM

## 2025-05-16 RX ORDER — NITROFURANTOIN 25; 75 MG/1; MG/1
100 CAPSULE ORAL 2 TIMES DAILY
Qty: 10 CAPSULE | Refills: 0 | Status: SHIPPED | OUTPATIENT
Start: 2025-05-16

## 2025-05-16 NOTE — PROGRESS NOTES
Mode of Visit: Video  Location of patient: -HOME-  Location of provider: +HOME+  You have chosen to receive care through a telehealth visit.  The patient has signed the video visit consent form.  The visit included audio and video interaction. No technical issues occurred during this visit.    ALISE Harris is a 24 y.o. female  presents with complaint of urinary problems.  Patient reports that she is suffering from from another UTI (like she had in March/April).  She also reports that she is having the same symptoms as she did the last time.  She reports that she is in real pain and has been taking AZO for relief.  She is not sure what is causing it.  The pain to your is referring to is pain with urination.  Other symptoms she is having include urinary frequency and urinary urgency.  She did try to get in with her primary care provider this morning but was unable to do so. She does have an appointment with her primary care provider on 06/11/2025.  She did get some relief from her symptoms this morning when she took Azo.  She also reports no chance of an STI..     Review of Systems   Constitutional:  Positive for chills. Negative for fatigue and fever.   Gastrointestinal:  Positive for nausea (resolved). Negative for diarrhea.   Genitourinary:  Positive for dysuria, frequency and urgency. Negative for flank pain, genital sores and vaginal discharge.        Urine is darker yellow than usual and has odor       Past Medical History:   Diagnosis Date    Acanthosis nigricans     COVID-19     Gastritis     secondary to NSAIDs    Influenza due to unidentified influenza virus with other respiratory manifestations     Major depressive disorder, single episode, unspecified     Morbid obesity     Obstructive sleep apnea on CPAP     Other conjunctivitis     Prediabetes     Viral infection, unspecified        Family History   Problem Relation Age of Onset    Heart failure Father     Obesity Father     Diabetes Other      Hypertension Other        Social History     Socioeconomic History    Marital status: Single   Tobacco Use    Smoking status: Never    Smokeless tobacco: Never   Vaping Use    Vaping status: Never Used   Substance and Sexual Activity    Alcohol use: Not Currently    Drug use: Not Currently    Sexual activity: Not Currently       Vickie Harris  reports that she has never smoked. She has never used smokeless tobacco. Clarification. She does vape and has been advised to quit. She does not dip.      Breastfeeding No     PHYSICAL EXAM  Physical Exam   Constitutional: She is oriented to person, place, and time. She appears well-developed.   HENT:   Head: Normocephalic and atraumatic.   Nose: Nose normal.   Eyes: Lids are normal. Right eye exhibits no discharge. Left eye exhibits no discharge. Right conjunctiva is not injected. Left conjunctiva is not injected.   Pulmonary/Chest:  No respiratory distress.  Abdominal: There is no abdominal tenderness. There is no CVA tenderness.   Neurological: She is alert and oriented to person, place, and time. No cranial nerve deficit.   Psychiatric: She has a normal mood and affect. Her speech is normal and behavior is normal. Judgment and thought content normal.       Results for orders placed or performed in visit on 12/04/24   Covid-19 + Flu A&B AG, Veritor    Collection Time: 12/04/24  8:54 AM    Specimen: Swab   Result Value Ref Range    SARS Antigen Not Detected Not Detected, Presumptive Negative    Influenza A Antigen JUSTIN Not Detected Not Detected    Influenza B Antigen JUSTIN Not Detected Not Detected    Internal Control Passed Passed    Lot Number [id number]     Expiration Date 10,232,025        Diagnoses and all orders for this visit:    1. Urinary tract infection without hematuria, site unspecified (Primary)    Other orders  -     nitrofurantoin, macrocrystal-monohydrate, (MACROBID) 100 MG capsule; Take 1 capsule by mouth 2 (Two) Times a Day.  Dispense: 10 capsule; Refill:  0    Nitrofurantoin as directed  Probiotics for two weeks related to taking antibiotics. The pharmacist can help you with this if needed. Do not davion within two hours of antibiotic.   Drink plenty of of clear decaffeinated fluids  Wipe front to back  If no relief or worsening of symptoms proceed for in person evaluation at urgent care    FOLLOW-UP  If symptoms worsen or persist follow up with PCP or Urgent Care for in person evaluation  Keep appointment with primary care provider on 06/11/2025    Patient verbalizes understanding of medication dosage, comfort measures, instructions for treatment and follow-up.    Amy Wallace, APRN  05/16/2025  14:25 EDT    The use of a video visit has been reviewed with the patient and verbal informed consent has been obtained. Myself and Vickie Harris participated in this visit. The patient is located in [address] [place] [address] [address] [place] [id number].    I am located in [place], [place]. [employer] and [employer] Client were utilized. I spent 18 minutes in the patient's chart for this visit.

## 2025-06-09 ENCOUNTER — TELEMEDICINE (OUTPATIENT)
Dept: FAMILY MEDICINE CLINIC | Facility: TELEHEALTH | Age: 25
End: 2025-06-09
Payer: COMMERCIAL

## 2025-06-09 DIAGNOSIS — H92.03 OTALGIA, BILATERAL: Primary | ICD-10-CM

## 2025-06-09 PROCEDURE — 99212 OFFICE O/P EST SF 10 MIN: CPT | Performed by: NURSE PRACTITIONER

## 2025-06-09 NOTE — PROGRESS NOTES
Subjective   Vickie Harris is a 24 y.o. female.     History of Present Illness  She presents with c/o earache which is worse on the right. She was seen at the ER and diagnosed with otitis media and prescribed doxycycline 3 days ago. She went back to the ER on Saturday and was given cipro-dex drops which she started yesterday for ear canal swelling.  She has pain and swelling around both ears. She has been using tylenol and heating pad but she is still in extreme pain and her blood pressure in up (170/90). Her PCP can't get her in. She is intolerant of NSAIDS.       The following portions of the patient's history were reviewed and updated as appropriate: allergies, current medications, past family history, past medical history, past social history, past surgical history, and problem list.    Review of Systems   Constitutional:  Positive for fever.   HENT:  Positive for ear pain and facial swelling.        Objective   Physical Exam  Constitutional:       General: She is in acute distress (crying from pain).      Appearance: She is well-developed. She is not diaphoretic.   HENT:      Right Ear: Swelling (around ear) present.      Left Ear: Swelling (around ear) present.   Pulmonary:      Effort: Pulmonary effort is normal.   Neurological:      Mental Status: She is alert and oriented to person, place, and time.   Psychiatric:         Behavior: Behavior normal.           Assessment & Plan   Diagnoses and all orders for this visit:    1. Otalgia, bilateral (Primary)              Return to ER today    The use of a video visit has been reviewed with the patient and verbal informed consent has been obtained. Myself and Vickie Harris participated in this visit. The patient is located in [place], [place]. I am located in [place], [place]. [employer] and [employer] Video Client were utilized. I spent 15 minutes in the patient's chart for this visit.

## 2025-06-23 ENCOUNTER — OFFICE VISIT (OUTPATIENT)
Dept: FAMILY MEDICINE CLINIC | Facility: CLINIC | Age: 25
End: 2025-06-23
Payer: COMMERCIAL

## 2025-06-23 VITALS
TEMPERATURE: 97.5 F | RESPIRATION RATE: 18 BRPM | WEIGHT: 293 LBS | HEART RATE: 109 BPM | BODY MASS INDEX: 45.99 KG/M2 | OXYGEN SATURATION: 98 % | HEIGHT: 67 IN

## 2025-06-23 DIAGNOSIS — E66.01 MORBID (SEVERE) OBESITY DUE TO EXCESS CALORIES: ICD-10-CM

## 2025-06-23 DIAGNOSIS — G47.33 OSA (OBSTRUCTIVE SLEEP APNEA): Primary | ICD-10-CM

## 2025-06-23 DIAGNOSIS — K04.7 DENTAL ABSCESS: ICD-10-CM

## 2025-06-23 DIAGNOSIS — H69.93 EUSTACHIAN TUBE DYSFUNCTION, BILATERAL: ICD-10-CM

## 2025-06-23 PROBLEM — R73.03 PREDIABETES: Status: ACTIVE | Noted: 2025-06-23

## 2025-06-23 PROCEDURE — 99214 OFFICE O/P EST MOD 30 MIN: CPT | Performed by: NURSE PRACTITIONER

## 2025-06-23 PROCEDURE — 1160F RVW MEDS BY RX/DR IN RCRD: CPT | Performed by: NURSE PRACTITIONER

## 2025-06-23 PROCEDURE — 1159F MED LIST DOCD IN RCRD: CPT | Performed by: NURSE PRACTITIONER

## 2025-06-23 RX ORDER — PREDNISONE 20 MG/1
40 TABLET ORAL DAILY
Qty: 14 TABLET | Refills: 0 | Status: SHIPPED | OUTPATIENT
Start: 2025-06-23 | End: 2025-06-30

## 2025-06-23 RX ORDER — CEPHALEXIN 500 MG/1
500 CAPSULE ORAL 2 TIMES DAILY
Qty: 14 CAPSULE | Refills: 0 | Status: SHIPPED | OUTPATIENT
Start: 2025-06-23 | End: 2025-06-30

## 2025-06-23 NOTE — PROGRESS NOTES
Office Note     Name: Vickie Harris    : 2000     MRN: [id number]     Chief Complaint  Earache and Dental Pain    Subjective     History of Present Illness:  Vickie Harris is a 24 y.o. female who presents today for complaints of left ear and dental pain. Patient is also interested in discussing weight loss options. Patient endorses having issues with her ears over the last several weeks. She endorses multiple ER visits, initially on . Patient states she was treated with both doxycycline and cephalexin during these visits without any improvement in her symptoms. Patient was eventually diagnosed with otitis externa, currently treating with ciprodex otic with complete resolution of symptoms on her right ear, however her left ear has continued to have discomfort. Patient feels that possibly her lingering ear pain is due to a broken tooth she suffered three days ago. Patient has dental appointment later this week but is having significant discomfort prompting her to come in today for possible antibiotics. Patient has utilized tylenol which somewhat lessened her pain. Patient has longstanding pattern of morbid obesity. She has noted in recent months increasing issues with arthralgias and fatigue and is aware it is likely due to her weight. Patient is now ready to pursue possible weight loss assistance through GLP-1 use, possibly bariatrics. Patient already suffers from TRACY , impaired fasting glucose and HTN due to her obesity. She has no further complaints or concerns.   Review of Systems   Constitutional:  Positive for fatigue.   HENT:  Positive for dental problem, ear pain and facial swelling. Negative for congestion, mouth sores, sore throat and trouble swallowing.    Respiratory:  Negative for cough and shortness of breath.    Cardiovascular:  Negative for chest pain, palpitations and leg swelling.   Gastrointestinal:  Negative for abdominal pain, constipation, diarrhea, nausea and vomiting.  "  Endocrine: Positive for polydipsia. Negative for polyuria.   Neurological:  Negative for dizziness, weakness, light-headedness and headache.       Objective     Past Medical History:   Diagnosis Date    Acanthosis nigricans     COVID-19     Gastritis     secondary to NSAIDs    Influenza due to unidentified influenza virus with other respiratory manifestations     Major depressive disorder, single episode, unspecified     Morbid obesity     Obstructive sleep apnea on CPAP     Other conjunctivitis     Prediabetes     Viral infection, unspecified      Past Surgical History:   Procedure Laterality Date    TONSILLECTOMY AND ADENOIDECTOMY       Family History   Problem Relation Age of Onset    Heart failure Father     Obesity Father     Diabetes Other     Hypertension Other        Vital Signs  Pulse 109   Temp 97.5 °F (36.4 °C) (Temporal)   Resp 18   Ht 170.2 cm (67\")   Wt (!) 254 kg (560 lb)   SpO2 98%   BMI 87.71 kg/m²   Estimated body mass index is 87.71 kg/m² as calculated from the following:    Height as of this encounter: 170.2 cm (67\").    Weight as of this encounter: 254 kg (560 lb).  Facility age limit for growth %holly is 20 years.    Physical Exam  Vitals reviewed.   Constitutional:       Appearance: She is obese.   HENT:      Right Ear: Tympanic membrane, ear canal and external ear normal.      Left Ear: Tympanic membrane, ear canal and external ear normal.      Nose: Nose normal.      Mouth/Throat:      Mouth: No oral lesions.      Dentition: Dental tenderness and dental abscesses present. No gum lesions.      Pharynx: No posterior oropharyngeal erythema.   Eyes:      Conjunctiva/sclera: Conjunctivae normal.   Cardiovascular:      Rate and Rhythm: Normal rate and regular rhythm.      Pulses: Normal pulses.      Heart sounds: Normal heart sounds.   Pulmonary:      Effort: Pulmonary effort is normal.      Breath sounds: Normal breath sounds.   Abdominal:      General: Bowel sounds are normal.      " Palpations: Abdomen is soft.   Musculoskeletal:      Cervical back: Neck supple.   Skin:     General: Skin is warm and dry.      Capillary Refill: Capillary refill takes less than 2 seconds.   Neurological:      Mental Status: She is alert and oriented to person, place, and time.             POCT Results (if applicable):  Results for orders placed or performed in visit on 12/04/24   Covid-19 + Flu A&B AG, Veritor    Collection Time: 12/04/24  8:54 AM    Specimen: Swab   Result Value Ref Range    SARS Antigen Not Detected Not Detected, Presumptive Negative    Influenza A Antigen JUSTIN Not Detected Not Detected    Influenza B Antigen JUSTIN Not Detected Not Detected    Internal Control Passed Passed    Lot Number [id number]     Expiration Date 10,232025             Assessment and Plan     Diagnoses and all orders for this visit:    1. TRACY (obstructive sleep apnea) (Primary)  Assessment & Plan:  Diagnosed 6th grade.   Last sleep study sometime in 2019 or 2020.  Been without machine due to non-compliance.   May need to refer back to sleep medicine. Will discuss further during annual physical.   Would benefit from initiation of Wegovy in regards to weight loss to improve apnea      2. Eustachian tube dysfunction, bilateral  -     predniSONE (DELTASONE) 20 MG tablet; Take 2 tablets by mouth Daily for 7 days.  Dispense: 14 tablet; Refill: 0    3. Dental abscess  Assessment & Plan:  Patient with significant break off involving the 16th tooth (left, upper 3rd molar). Some mild purulence and gum inflammation noted on exam. Will treat with cephalexin given PCN allergy. Patient also advised warm salt water swish and spit several times daily. She does have dental appt for follow up in four days.     Orders:  -     cephalexin (KEFLEX) 500 MG capsule; Take 1 capsule by mouth 2 (Two) Times a Day for 7 days.  Dispense: 14 capsule; Refill: 0    4. Morbid (severe) obesity due to excess calories  Assessment & Plan:  Patient's (Body mass  index is 87.71 kg/m².) indicates that they are morbidly/severely obese (BMI > 40 or > 35 with obesity - related health condition) with health conditions that include obstructive sleep apnea, hypertension, and impaired fasting glucose . Weight is worsening. BMI  is above average; BMI management plan is completed. We discussed portion control, increasing exercise, and pharmacologic options including GLP-1 medication.   Patient has longstanding pattern of morbid obesity. She has noted in recent months increasing issues with arthralgias and fatigue and is aware it is likely due to her weight. Patient is now ready to pursue possible weight loss assistance through GLP-1 use, possibly bariatrics. Patient already suffers from TRACY , impaired fasting glucose and HTN due to her obesity. She is going to schedule her annual physical at which time we will further discuss next steps.              Follow Up  Return for Annual physical.    Mckenna Rea, APRN

## 2025-06-23 NOTE — ASSESSMENT & PLAN NOTE
Patient with significant break off involving the 16th tooth (left, upper 3rd molar). Some mild purulence and gum inflammation noted on exam. Will treat with cephalexin given PCN allergy. Patient also advised warm salt water swish and spit several times daily. She does have dental appt for follow up in four days.

## 2025-06-23 NOTE — ASSESSMENT & PLAN NOTE
Diagnosed 6th grade.   Last sleep study sometime in 2019 or 2020.  Been without machine due to non-compliance.   May need to refer back to sleep medicine. Will discuss further during annual physical.   Would benefit from initiation of Wegovy in regards to weight loss to improve apnea

## 2025-07-01 NOTE — ASSESSMENT & PLAN NOTE
Patient's (Body mass index is 87.71 kg/m².) indicates that they are morbidly/severely obese (BMI > 40 or > 35 with obesity - related health condition) with health conditions that include obstructive sleep apnea, hypertension, and impaired fasting glucose . Weight is worsening. BMI  is above average; BMI management plan is completed. We discussed portion control, increasing exercise, and pharmacologic options including GLP-1 medication.   Patient has longstanding pattern of morbid obesity. She has noted in recent months increasing issues with arthralgias and fatigue and is aware it is likely due to her weight. Patient is now ready to pursue possible weight loss assistance through GLP-1 use, possibly bariatrics. Patient already suffers from TRACY , impaired fasting glucose and HTN due to her obesity. She is going to schedule her annual physical at which time we will further discuss next steps.

## 2025-07-02 ENCOUNTER — OFFICE VISIT (OUTPATIENT)
Dept: FAMILY MEDICINE CLINIC | Facility: CLINIC | Age: 25
End: 2025-07-02
Payer: COMMERCIAL

## 2025-07-02 VITALS
WEIGHT: 293 LBS | RESPIRATION RATE: 20 BRPM | DIASTOLIC BLOOD PRESSURE: 88 MMHG | HEIGHT: 67 IN | HEART RATE: 88 BPM | SYSTOLIC BLOOD PRESSURE: 122 MMHG | BODY MASS INDEX: 45.99 KG/M2 | TEMPERATURE: 98.8 F | OXYGEN SATURATION: 97 %

## 2025-07-02 DIAGNOSIS — F32.A ANXIETY AND DEPRESSION: Primary | ICD-10-CM

## 2025-07-02 DIAGNOSIS — Z00.00 ANNUAL PHYSICAL EXAM: ICD-10-CM

## 2025-07-02 DIAGNOSIS — G43.009 MIGRAINE WITHOUT AURA AND WITHOUT STATUS MIGRAINOSUS, NOT INTRACTABLE: ICD-10-CM

## 2025-07-02 DIAGNOSIS — I10 ESSENTIAL HYPERTENSION: ICD-10-CM

## 2025-07-02 DIAGNOSIS — G47.33 OSA (OBSTRUCTIVE SLEEP APNEA): ICD-10-CM

## 2025-07-02 DIAGNOSIS — R73.03 PREDIABETES: ICD-10-CM

## 2025-07-02 DIAGNOSIS — F41.9 ANXIETY AND DEPRESSION: Primary | ICD-10-CM

## 2025-07-02 DIAGNOSIS — E55.9 VITAMIN D DEFICIENCY: ICD-10-CM

## 2025-07-02 DIAGNOSIS — E66.01 MORBID (SEVERE) OBESITY DUE TO EXCESS CALORIES: ICD-10-CM

## 2025-07-02 PROCEDURE — 3079F DIAST BP 80-89 MM HG: CPT | Performed by: NURSE PRACTITIONER

## 2025-07-02 PROCEDURE — 82044 UR ALBUMIN SEMIQUANTITATIVE: CPT | Performed by: NURSE PRACTITIONER

## 2025-07-02 PROCEDURE — 82570 ASSAY OF URINE CREATININE: CPT | Performed by: NURSE PRACTITIONER

## 2025-07-02 PROCEDURE — 1160F RVW MEDS BY RX/DR IN RCRD: CPT | Performed by: NURSE PRACTITIONER

## 2025-07-02 PROCEDURE — 99214 OFFICE O/P EST MOD 30 MIN: CPT | Performed by: NURSE PRACTITIONER

## 2025-07-02 PROCEDURE — 99395 PREV VISIT EST AGE 18-39: CPT | Performed by: NURSE PRACTITIONER

## 2025-07-02 PROCEDURE — 1159F MED LIST DOCD IN RCRD: CPT | Performed by: NURSE PRACTITIONER

## 2025-07-02 PROCEDURE — 3074F SYST BP LT 130 MM HG: CPT | Performed by: NURSE PRACTITIONER

## 2025-07-02 NOTE — PROGRESS NOTES
Venipuncture Blood Specimen Collection  Venipuncture performed in left arm by Abeba Kang MA with good hemostasis. Patient tolerated the procedure well without complications.   07/02/25   Abeba Kang MA

## 2025-07-02 NOTE — PROGRESS NOTES
Annual Physical     Name: Vickie Harris    : 2000     MRN: [id number]     Chief Complaint  Annual Exam    Subjective     History of Present Illness:  Vickie Harris is a 24 y.o. female who presents today for annual physical exam.  Patient is followed for chronic conditions including anxiety, depression, hypertension, migraine, obesity, TRACY and prediabetes.  Patient noted to have some diastolic elevation in office today, 122/88.  She continues to utilize losartan 100 mg daily as well as chlorthalidone 25 mg daily.  Patient prediabetic with A1c in office today of 6.3%.  Patient has battled with severe obesity her entire life, continues to worsen.  Patient verbalizes that she is starting to feel the effects of her morbid obesity, particularly back pain, myalgias and difficulty lying flat to sleep.  Patient has already started trying to eat healthier, decreasing refined sugars, she has given up soda.  Given body habitus it has been difficult for her to participate in any type of consistent exercise but tries to walk extra steps daily.  Patient states she is at a time in her life she needs to focus on her health with the primary focus being weight loss.  Patient would like assistance of GLP-1 medication, is not ready to give consideration to bariatric surgery.  Patient suffers from obstructive sleep apnea diagnosed when she was in the sixth grade.  She believes her last sleep study was sometime between 2019 or  and Dr. Patel office.  Patient has been without machine due to noncompliance for quite some time.  Patient has been seen by woman to woman for GYN evaluation, never having undergone Pap smear.  Patient states that she was given order for further evaluation of possible PCOS but never followed up.  She has no further complaints or concerns today    The patient is being seen for a health maintenance evaluation.    Social History  Tobacco Use: Low Risk  (2025)    Patient History     Smoking  Tobacco Use: Never     Smokeless Tobacco Use: Never     Passive Exposure: Not on file     Social History     Socioeconomic History    Marital status: Single   Tobacco Use    Smoking status: Never    Smokeless tobacco: Never   Vaping Use    Vaping status: Never Used   Substance and Sexual Activity    Alcohol use: Not Currently    Drug use: Not Currently    Sexual activity: Not Currently       General History  Vickie  does not have regular dental visits.  She does not complain of vision problems. Last eye exam was 07/25/2025.  Immunizations are up to date.   Lifestyle  Vickie  consumes well balanced. Limited sweets and caffeine in recent weeks.  She exercises never.    Reproductive Health  She reports periods are irregular.  She is not sexually active. Her contraceptive plan is no method.    Screening  Last pap was   Last Completed Pap Smear    This patient has no relevant Health Maintenance data.     . History of abnormal pap smear or family history of gyn cancer: no  Last mammogram was   Last Completed Mammogram    This patient has no relevant Health Maintenance data.     . Personal or family history of abnormal mammograms or breast cancer: no  Last colonoscopy was   Last Completed Colonoscopy    This patient has no relevant Health Maintenance data.     . Family history of colon cancer: no      Health Maintenance Summary            Current Care Gaps       COVID-19 Vaccine (5 - 2024-25 season) Overdue since 9/1/2024 12/22/2023  Postponed until 12/24/2023 by Erika Granados (Patient Refused - not today)    09/17/2021  Imm Admin: COVID-19 (PFIZER) PURPLE CAP    09/17/2021  Imm Admin: COVID-19 (UNSPECIFIED)    08/27/2021  Imm Admin: COVID-19 (PFIZER) PURPLE CAP    08/27/2021  Imm Admin: COVID-19 (UNSPECIFIED)     Only the first 5 history entries have been loaded, but more history exists.            HPV VACCINES (1 - 3-dose series) Never done      12/22/2023  Postponed until 12/22/2023 by Erika Granados  (Patient Refused - not today)              ANNUAL PHYSICAL (Yearly) Never done      No completion, postpone, or frequency change history exists for this topic.              PAP SMEAR (Every 3 Years) Never done     No completion, postpone, or frequency change history exists for this topic.                      Upcoming       INFLUENZA VACCINE (Yearly - October to March) Next due on 10/1/2025      12/22/2023  Postponed until 3/31/2024 by Erika Granados (Patient Refused - not today)    03/28/2023  Postponed until 8/1/2023 by Lilly Bagley (Patient Refused)              TDAP/TD VACCINES (2 - Td or Tdap) Next due on 10/24/2026      10/24/2016  Imm Admin: Tdap                      Completed or No Longer Recommended       HEPATITIS C SCREENING  Completed      07/02/2025  Hep C Virus Ab component of Hepatitis C Antibody              Pneumococcal Vaccine 0-49 (Series Information) Aged Out     No completion, postpone, or frequency change history exists for this topic.                          Immunization History   Administered Date(s) Administered    COVID-19 (PFIZER) Purple Cap Monovalent 08/27/2021, 09/17/2021    COVID-19 (UNSPECIFIED) 08/27/2021, 09/17/2021    DTaP, Unspecified 01/06/1999, 03/05/1999, 04/28/1999, 04/12/2001, 09/05/2003    Hep B, Adolescent or Pediatric 2000, 07/23/1999, 05/10/2001    Hepatitis B Adolescent High Risk Infant 2000, 07/23/1999    HiB 01/06/1999, 03/05/1999, 04/28/1999    Hib (PRP-OMP) 04/12/2001    IPV 01/06/1999, 03/05/1999, 05/10/2001, 09/05/2003    MCV4 Unspecified 10/24/2016    MMR 05/10/2001, 09/05/2003    Meningococcal MCV4P (Menactra) 10/24/2016, 10/24/2016    Tdap 10/24/2016    Varicella 04/12/2001, 10/24/2016       Review of Systems   Constitutional:  Positive for fatigue.   Eyes:  Negative for visual disturbance.   Respiratory:  Negative for cough, chest tightness, shortness of breath and wheezing.    Cardiovascular:  Negative for chest pain, palpitations and leg  "swelling.   Gastrointestinal:  Negative for abdominal pain and constipation.   Endocrine: Negative for polydipsia and polyuria.   Genitourinary:  Negative for difficulty urinating, flank pain, frequency, hematuria and urgency.   Musculoskeletal:  Positive for back pain. Negative for arthralgias and myalgias.   Neurological:  Positive for headaches. Negative for dizziness, weakness, light-headedness and numbness.   Hematological:  Does not bruise/bleed easily.   Psychiatric/Behavioral:  Negative for agitation, self-injury, sleep disturbance and suicidal ideas. The patient is not nervous/anxious.        Objective     Vital Signs  /88 (BP Location: Left arm, Patient Position: Sitting, Cuff Size: Adult)   Pulse 88   Temp 98.8 °F (37.1 °C) (Temporal)   Resp 20   Ht 170.2 cm (67\")   Wt (!) 227 kg (499 lb 9.6 oz)   SpO2 97%   BMI 78.25 kg/m²   Estimated body mass index is 78.25 kg/m² as calculated from the following:    Height as of this encounter: 170.2 cm (67\").    Weight as of this encounter: 227 kg (499 lb 9.6 oz).            Physical Exam  Vitals reviewed.   Constitutional:       Appearance: Normal appearance. She is obese.   HENT:      Head: Normocephalic and atraumatic.      Right Ear: Tympanic membrane, ear canal and external ear normal.      Left Ear: Tympanic membrane, ear canal and external ear normal.      Nose: Nose normal.      Mouth/Throat:      Mouth: Mucous membranes are moist.      Pharynx: Oropharynx is clear.   Eyes:      Conjunctiva/sclera: Conjunctivae normal.      Pupils: Pupils are equal, round, and reactive to light.   Cardiovascular:      Rate and Rhythm: Normal rate and regular rhythm.      Pulses: Normal pulses.      Heart sounds: Normal heart sounds.   Pulmonary:      Effort: Pulmonary effort is normal.      Breath sounds: Normal breath sounds.   Abdominal:      General: Bowel sounds are normal.      Palpations: Abdomen is soft.   Genitourinary:     Comments: " Decline  Musculoskeletal:         General: Normal range of motion.      Cervical back: Neck supple.   Skin:     General: Skin is warm and dry.      Capillary Refill: Capillary refill takes less than 2 seconds.   Neurological:      Mental Status: She is alert and oriented to person, place, and time.          Assessment and Plan     Diagnoses and all orders for this visit:    1. Anxiety and depression (Primary)  Assessment & Plan:  Patient with longstanding pattern of anxiety and depression, predominant pattern of being very worrisome and overthinking.  Her symptoms have been well-controlled with Lexapro 20 mg daily for quite some time.      2. Essential hypertension  Assessment & Plan:  Patient currently utilizing regimen of amlodipine 5 mg daily, chlorthalidone 25 mg daily and losartan 100 mg daily.  Her blood pressure in office today is 122/88.  Would optimally like to see her diastolic pressure less than 80.  I am requesting she keep home blood pressure log for review on her next visit in 4 weeks.      3. Migraine without aura and without status migrainosus, not intractable  Assessment & Plan:  Has history of migraine that includes headache, nausea, light and sound sensitivity.  They tend to be triggered more so by stress.  She does not utilize any prophylactic medications.  They have generally responded well to seclusion in a dark room with complete quiet.        4. Morbid (severe) obesity due to excess calories  Assessment & Plan:  Patient's (Body mass index is 78.25 kg/m².) indicates that they are morbidly/severely obese (BMI > 40 or > 35 with obesity - related health condition) with health conditions that include obstructive sleep apnea and hypertension . Weight is worsening. BMI  is above average; BMI management plan is completed. We discussed portion control and increasing exercisePatient has battled with severe obesity her entire life, continues to worsen.  Patient verbalizes that she is starting to feel  the effects of her morbid obesity, particularly back pain, myalgias and difficulty lying flat to sleep.  Patient has already started trying to eat healthier, decreasing refined sugars, she has given up soda.  Given body habitus it has been difficult for her to participate in any type of consistent exercise but tries to walk extra steps daily.  Patient states she is at a time in her life she needs to focus on her health with the primary focus being weight loss.  Patient would like assistance of GLP-1 medication, is not ready to give consideration to bariatric surgery.  We did test potential side effects of GLP-1 use including abdominal cramping, nausea, vomiting, diarrhea or constipation.  We discussed dietary plan which focuses on high-protein and fiber intake as well as water intake 60 to 100 ounces daily at least.  We also discussed exercises she could participate in while at her current body habitus including strength training and simple walking.  - Initiate semaglutide 0.25 mg weekly  - Follow-up in 4 weeks        5. TRACY (obstructive sleep apnea)  Assessment & Plan:  Patient suffers from obstructive sleep apnea diagnosed when she was in the sixth grade.  She believes her last sleep study was sometime between 2019 or 2020 and Dr. Patel office.  Patient has been without machine due to noncompliance for quite some time.  And is agreeable to revisit sleep medicine as it may assist with getting approval for Wegovy which will also help with weight loss and overall better health outcomes.      6. Prediabetes  Assessment & Plan:  Patient with history of prediabetes, A1c in office today 6.3%    Orders:  -     POC Albumin/Creatinine Ratio Urine  -     Hemoglobin A1c; Future  -     Hemoglobin A1c    7. Annual physical exam  -     CBC (No Diff); Future  -     Comprehensive Metabolic Panel; Future  -     Lipid Panel; Future  -     TSH; Future  -     T4, Free; Future  -     Hepatitis C Antibody; Future  -     Hepatitis C  Antibody  -     T4, Free  -     TSH  -     Lipid Panel  -     Comprehensive Metabolic Panel  -     CBC (No Diff)    8. Vitamin D deficiency  -     Vitamin D,25-Hydroxy; Future  -     Vitamin D,25-Hydroxy        Follow Up  No follow-ups on file.    Mckenna Rea, APRN

## 2025-07-03 LAB
25(OH)D3+25(OH)D2 SERPL-MCNC: 11.4 NG/ML (ref 30–100)
ALBUMIN SERPL-MCNC: 4 G/DL (ref 4–5)
ALP SERPL-CCNC: 72 IU/L (ref 44–121)
ALT SERPL-CCNC: 34 IU/L (ref 0–32)
AST SERPL-CCNC: 19 IU/L (ref 0–40)
BILIRUB SERPL-MCNC: 0.5 MG/DL (ref 0–1.2)
BUN SERPL-MCNC: 12 MG/DL (ref 6–20)
BUN/CREAT SERPL: 17 (ref 9–23)
CALCIUM SERPL-MCNC: 9 MG/DL (ref 8.7–10.2)
CHLORIDE SERPL-SCNC: 96 MMOL/L (ref 96–106)
CHOLEST SERPL-MCNC: 123 MG/DL (ref 100–199)
CO2 SERPL-SCNC: 23 MMOL/L (ref 20–29)
CREAT SERPL-MCNC: 0.7 MG/DL (ref 0.57–1)
EGFRCR SERPLBLD CKD-EPI 2021: 122 ML/MIN/1.73
ERYTHROCYTE [DISTWIDTH] IN BLOOD BY AUTOMATED COUNT: 18.2 % (ref 11.7–15.4)
EXPIRATION DATE: NORMAL
GLOBULIN SER CALC-MCNC: 3 G/DL (ref 1.5–4.5)
GLUCOSE SERPL-MCNC: 81 MG/DL (ref 70–99)
HBA1C MFR BLD: 6.3 % (ref 4.8–5.6)
HCT VFR BLD AUTO: 43.3 % (ref 34–46.6)
HCV IGG SERPL QL IA: NON REACTIVE
HDLC SERPL-MCNC: 43 MG/DL
HGB BLD-MCNC: 12.8 G/DL (ref 11.1–15.9)
LDLC SERPL CALC-MCNC: 47 MG/DL (ref 0–99)
Lab: NORMAL
MCH RBC QN AUTO: 23.3 PG (ref 26.6–33)
MCHC RBC AUTO-ENTMCNC: 29.6 G/DL (ref 31.5–35.7)
MCV RBC AUTO: 79 FL (ref 79–97)
PLATELET # BLD AUTO: 338 X10E3/UL (ref 150–450)
POC ALBUMIN, URINE: 30 MG/L
POC CREATININE, URINE: 300 MG/DL
POC URINE ALB/CREA RATIO: <30
POTASSIUM SERPL-SCNC: 3.8 MMOL/L (ref 3.5–5.2)
PROT SERPL-MCNC: 7 G/DL (ref 6–8.5)
RBC # BLD AUTO: 5.5 X10E6/UL (ref 3.77–5.28)
SODIUM SERPL-SCNC: 138 MMOL/L (ref 134–144)
T4 FREE SERPL-MCNC: 1.41 NG/DL (ref 0.82–1.77)
TRIGL SERPL-MCNC: 204 MG/DL (ref 0–149)
TSH SERPL DL<=0.005 MIU/L-ACNC: 2.7 UIU/ML (ref 0.45–4.5)
VLDLC SERPL CALC-MCNC: 33 MG/DL (ref 5–40)
WBC # BLD AUTO: 14.6 X10E3/UL (ref 3.4–10.8)

## 2025-07-07 ENCOUNTER — TELEPHONE (OUTPATIENT)
Dept: FAMILY MEDICINE CLINIC | Facility: CLINIC | Age: 25
End: 2025-07-07
Payer: COMMERCIAL

## 2025-07-07 ENCOUNTER — RESULTS FOLLOW-UP (OUTPATIENT)
Dept: FAMILY MEDICINE CLINIC | Facility: CLINIC | Age: 25
End: 2025-07-07
Payer: COMMERCIAL

## 2025-07-07 DIAGNOSIS — G47.33 OSA (OBSTRUCTIVE SLEEP APNEA): Primary | ICD-10-CM

## 2025-07-07 RX ORDER — ERGOCALCIFEROL 1.25 MG/1
50000 CAPSULE, LIQUID FILLED ORAL WEEKLY
Qty: 5 CAPSULE | Refills: 3 | Status: SHIPPED | OUTPATIENT
Start: 2025-07-07
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
## 2025-07-08 PROBLEM — J40 BRONCHITIS: Status: RESOLVED | Noted: 2022-11-29 | Resolved: 2025-07-08

## 2025-07-08 PROBLEM — B34.9 VIRAL SYNDROME: Status: RESOLVED | Noted: 2022-11-09 | Resolved: 2025-07-08

## 2025-07-08 PROBLEM — H66.001 NON-RECURRENT ACUTE SUPPURATIVE OTITIS MEDIA OF RIGHT EAR WITHOUT SPONTANEOUS RUPTURE OF TYMPANIC MEMBRANE: Status: RESOLVED | Noted: 2023-12-22 | Resolved: 2025-07-08

## 2025-07-08 PROBLEM — K04.7 DENTAL ABSCESS: Status: RESOLVED | Noted: 2025-06-23 | Resolved: 2025-07-08

## 2025-07-08 PROBLEM — U07.1 COVID-19: Status: RESOLVED | Noted: 2024-03-05 | Resolved: 2025-07-08

## 2025-07-08 PROBLEM — H57.9 ITCHY EYES: Status: RESOLVED | Noted: 2024-08-27 | Resolved: 2025-07-08

## 2025-07-08 PROBLEM — J02.0 STREP PHARYNGITIS: Status: RESOLVED | Noted: 2024-03-05 | Resolved: 2025-07-08

## 2025-07-08 PROBLEM — J30.1 SEASONAL ALLERGIC RHINITIS DUE TO POLLEN: Status: RESOLVED | Noted: 2023-06-19 | Resolved: 2025-07-08

## 2025-07-08 PROBLEM — R30.0 DYSURIA: Status: RESOLVED | Noted: 2023-06-19 | Resolved: 2025-07-08

## 2025-07-09 NOTE — ASSESSMENT & PLAN NOTE
Patient currently utilizing regimen of amlodipine 5 mg daily, chlorthalidone 25 mg daily and losartan 100 mg daily.  Her blood pressure in office today is 122/88.  Would optimally like to see her diastolic pressure less than 80.  I am requesting she keep home blood pressure log for review on her next visit in 4 weeks.

## 2025-07-09 NOTE — ASSESSMENT & PLAN NOTE
Has history of migraine that includes headache, nausea, light and sound sensitivity.  They tend to be triggered more so by stress.  She does not utilize any prophylactic medications.  They have generally responded well to seclusion in a dark room with complete quiet.

## 2025-07-09 NOTE — ASSESSMENT & PLAN NOTE
Patient's (Body mass index is 78.25 kg/m².) indicates that they are morbidly/severely obese (BMI > 40 or > 35 with obesity - related health condition) with health conditions that include obstructive sleep apnea and hypertension . Weight is worsening. BMI  is above average; BMI management plan is completed. We discussed portion control and increasing exercisePatient has battled with severe obesity her entire life, continues to worsen.  Patient verbalizes that she is starting to feel the effects of her morbid obesity, particularly back pain, myalgias and difficulty lying flat to sleep.  Patient has already started trying to eat healthier, decreasing refined sugars, she has given up soda.  Given body habitus it has been difficult for her to participate in any type of consistent exercise but tries to walk extra steps daily.  Patient states she is at a time in her life she needs to focus on her health with the primary focus being weight loss.  Patient would like assistance of GLP-1 medication, is not ready to give consideration to bariatric surgery.  We did test potential side effects of GLP-1 use including abdominal cramping, nausea, vomiting, diarrhea or constipation.  We discussed dietary plan which focuses on high-protein and fiber intake as well as water intake 60 to 100 ounces daily at least.  We also discussed exercises she could participate in while at her current body habitus including strength training and simple walking.  - Initiate semaglutide 0.25 mg weekly  - Follow-up in 4 weeks

## 2025-07-09 NOTE — ASSESSMENT & PLAN NOTE
Patient suffers from obstructive sleep apnea diagnosed when she was in the sixth grade.  She believes her last sleep study was sometime between 2019 or 2020 and Dr. Patel office.  Patient has been without machine due to noncompliance for quite some time.  And is agreeable to revisit sleep medicine as it may assist with getting approval for Wegovy which will also help with weight loss and overall better health outcomes.

## 2025-07-09 NOTE — ASSESSMENT & PLAN NOTE
Patient with longstanding pattern of anxiety and depression, predominant pattern of being very worrisome and overthinking.  Her symptoms have been well-controlled with Lexapro 20 mg daily for quite some time.

## 2025-07-21 ENCOUNTER — TELEMEDICINE (OUTPATIENT)
Dept: FAMILY MEDICINE CLINIC | Facility: TELEHEALTH | Age: 25
End: 2025-07-21
Payer: COMMERCIAL

## 2025-07-21 DIAGNOSIS — R39.89 SUSPECTED UTI: Primary | ICD-10-CM

## 2025-07-21 RX ORDER — SULFAMETHOXAZOLE AND TRIMETHOPRIM 800; 160 MG/1; MG/1
1 TABLET ORAL 2 TIMES DAILY
Qty: 14 TABLET | Refills: 0 | Status: SHIPPED | OUTPATIENT
Start: 2025-07-21 | End: 2025-07-28

## 2025-07-21 NOTE — PROGRESS NOTES
You have chosen to receive care through a telehealth visit.  Do you consent to use a video/audio connection for your medical care today? Yes     HPI  History of Present Illness  The patient presents via virtual visit for symptoms indicative of a urinary tract infection (UTI).    She reports experiencing cloudy urine, back pain, cramping, and has been in the bathroom since 7:00 AM due to pain, spotting, and mild bleeding. No fever has been noted. She describes the pain as similar to previous UTIs. She has been off her menstrual cycle for about 4 days. A history of bladder issues since childhood is noted, though not related to UTIs. This morning, she woke up with a strong urge to urinate, which was accompanied by  urinary frequency and a burning sensation while urinating.  She maintains good hygiene and drinks plenty of water. She does not develop yeast infections when taking antibiotics. She has been using Azo for relief. She was previously treated with Macrobid for a UTI in 05/2025, which was effective.     She has an appointment with her gynecologist in 08/2025 to discuss her recurrent UTIs and has not had a Pap smear yet. She is not sexually active and tends to hold her urine when she feels the urge to urinate.     Social History:  Sexual Practices: Not sexually active    Review of Systems   Constitutional: Negative.    Genitourinary:  Positive for dysuria, frequency and urgency.   Musculoskeletal:  Positive for back pain.   Psychiatric/Behavioral: Negative.          Past Medical History:   Diagnosis Date    Acanthosis nigricans     Allergic     Anemia     Anxiety     COVID-19     Gastritis     secondary to NSAIDs    Headache     Influenza due to unidentified influenza virus with other respiratory manifestations     Major depressive disorder, single episode, unspecified     Morbid obesity     Obstructive sleep apnea on CPAP     Other conjunctivitis     Prediabetes     Viral infection, unspecified        Family  History   Problem Relation Age of Onset    Depression Mother     Heart failure Father     Obesity Father     Hearing loss Father         Hearing Loss in Left Ear    Heart disease Father     Hyperlipidemia Father     Diabetes Other     Hypertension Other     Diabetes Maternal Uncle        Social History     Socioeconomic History    Marital status: Single   Tobacco Use    Smoking status: Never    Smokeless tobacco: Never   Vaping Use    Vaping status: Never Used   Substance and Sexual Activity    Alcohol use: Not Currently    Drug use: Not Currently    Sexual activity: Not Currently         There were no vitals taken for this visit.    PHYSICAL EXAM  Physical Exam   Constitutional: She appears well-developed and well-nourished. She does not have a sickly appearance. She does not appear ill. No distress.   Abdominal: She exhibits no distension. There is abdominal tenderness (mild pressure in supra pubic area) in the suprapubic area. There is no CVA tenderness.   Neurological: She is alert.   Psychiatric: She has a normal mood and affect.   Vitals reviewed.      Diagnoses and all orders for this visit:    1. Suspected UTI (Primary)  -     sulfamethoxazole-trimethoprim (Bactrim DS) 800-160 MG per tablet; Take 1 tablet by mouth 2 (Two) Times a Day for 7 days.  Dispense: 14 tablet; Refill: 0      Assessment & Plan  1. Urinary Tract Infection.  - Symptoms include cloudy urine, back pain, spotting, and burning during urination.  - History of recurrent UTIs; previously treated with Macrobid in 05/2025.  - Discussed the importance of obtaining a urine specimen before starting antibiotics to identify the causative bacteria.  - Prescription for Bactrim sent to pharmacy; Pyridium can be taken 3 times daily as needed for pain and burning, and discontinued once symptoms subside. Information on UTIs will be provided for further understanding.    2. Vitamin D Deficiency.  - Low vitamin D levels identified during previous lab work.  -  Currently on a weekly vitamin D medication.    3. Sleep Apnea.  - In the process of obtaining an updated sleep apnea machine.  - Coordinating with Dr. Mireles's office for a sleep apnea study.    4. Medication Management.  - Pending insurance approval for Ozempic.      FOLLOW-UP  As discussed during visit with [facility], if symptoms worsen or fail to improve, follow-up with PCP/Urgent Care/Emergency Department.    Patient verbalizes understanding of medications, instructions for treatment and follow-up.    Patient or patient representative verbalized consent for the use of Ambient Listening during the visit with  GABRIELA Venegas for chart documentation. 7/21/2025  10:56 EDT    GABRIELA Venegas  07/21/2025  10:56 EDT    The use of a video visit has been reviewed with the patient and verbal informed consent has been obtained. Myself and Vickie Harris participated in this visit. The patient is located in [facility], and I am located in [place], [place]. [employer] and [employer]  were utilized.

## 2025-08-05 ENCOUNTER — TELEPHONE (OUTPATIENT)
Dept: CARDIOLOGY | Facility: CLINIC | Age: 25
End: 2025-08-05
Payer: COMMERCIAL

## 2025-08-09 DIAGNOSIS — J30.1 SEASONAL ALLERGIC RHINITIS DUE TO POLLEN: ICD-10-CM

## 2025-08-11 RX ORDER — FLUTICASONE PROPIONATE 50 MCG
SPRAY, SUSPENSION (ML) NASAL
Qty: 16 G | Refills: 1 | Status: SHIPPED | OUTPATIENT
Start: 2025-08-11

## 2025-08-13 ENCOUNTER — OFFICE VISIT (OUTPATIENT)
Dept: CARDIOLOGY | Facility: CLINIC | Age: 25
End: 2025-08-13
Payer: COMMERCIAL

## 2025-08-13 ENCOUNTER — PATIENT ROUNDING (BHMG ONLY) (OUTPATIENT)
Dept: CARDIOLOGY | Facility: CLINIC | Age: 25
End: 2025-08-13

## 2025-08-13 VITALS
SYSTOLIC BLOOD PRESSURE: 122 MMHG | WEIGHT: 293 LBS | TEMPERATURE: 97.6 F | OXYGEN SATURATION: 99 % | HEIGHT: 67 IN | BODY MASS INDEX: 45.99 KG/M2 | DIASTOLIC BLOOD PRESSURE: 78 MMHG | HEART RATE: 107 BPM

## 2025-08-13 DIAGNOSIS — G47.33 OSA (OBSTRUCTIVE SLEEP APNEA): ICD-10-CM

## 2025-08-13 DIAGNOSIS — E66.01 MORBID (SEVERE) OBESITY DUE TO EXCESS CALORIES: ICD-10-CM
